# Patient Record
Sex: MALE | Race: WHITE | Employment: UNEMPLOYED | URBAN - NONMETROPOLITAN AREA
[De-identification: names, ages, dates, MRNs, and addresses within clinical notes are randomized per-mention and may not be internally consistent; named-entity substitution may affect disease eponyms.]

---

## 2017-07-18 ENCOUNTER — HOSPITAL ENCOUNTER (OUTPATIENT)
Dept: MRI IMAGING | Age: 52
Discharge: HOME | End: 2017-07-18
Payer: MEDICARE

## 2017-07-18 DIAGNOSIS — M48.061 SPINAL STENOSIS OF LUMBAR REGION: ICD-10-CM

## 2017-07-18 PROCEDURE — 72148 MRI LUMBAR SPINE W/O DYE: CPT

## 2018-07-03 ENCOUNTER — HOSPITAL ENCOUNTER (OUTPATIENT)
Dept: NON INVASIVE DIAGNOSTICS | Age: 53
Discharge: HOME | End: 2018-07-03
Payer: MEDICARE

## 2018-07-03 VITALS — WEIGHT: 252 LBS | BODY MASS INDEX: 31.33 KG/M2 | HEIGHT: 75 IN

## 2018-07-03 PROCEDURE — 78452 HT MUSCLE IMAGE SPECT MULT: CPT

## 2018-07-03 PROCEDURE — 6360000002 HC RX W HCPCS

## 2018-07-03 PROCEDURE — 93017 CV STRESS TEST TRACING ONLY: CPT

## 2018-07-03 PROCEDURE — 3430000000 HC RX DIAGNOSTIC RADIOPHARMACEUTICAL: Performed by: INTERNAL MEDICINE

## 2018-07-03 PROCEDURE — A9500 TC99M SESTAMIBI: HCPCS | Performed by: INTERNAL MEDICINE

## 2018-07-03 RX ADMIN — Medication 9.2 MILLICURIE: at 13:42

## 2018-07-03 RX ADMIN — Medication 31 MILLICURIE: at 14:45

## 2018-07-09 ENCOUNTER — HOSPITAL ENCOUNTER (OUTPATIENT)
Dept: GENERAL RADIOLOGY | Age: 53
Discharge: HOME | End: 2018-07-09
Payer: MEDICARE

## 2018-07-09 ENCOUNTER — HOSPITAL ENCOUNTER (OUTPATIENT)
Age: 53
Discharge: HOME | End: 2018-07-09
Payer: MEDICARE

## 2018-07-09 DIAGNOSIS — Z01.818 PREOP EXAMINATION: ICD-10-CM

## 2018-07-09 PROCEDURE — 71046 X-RAY EXAM CHEST 2 VIEWS: CPT

## 2019-04-17 ENCOUNTER — HOSPITAL ENCOUNTER (OUTPATIENT)
Dept: PHYSICAL THERAPY | Age: 54
Setting detail: THERAPIES SERIES
Discharge: HOME | End: 2019-04-17
Payer: MEDICARE

## 2019-04-17 PROCEDURE — 97110 THERAPEUTIC EXERCISES: CPT

## 2019-04-17 PROCEDURE — 97162 PT EVAL MOD COMPLEX 30 MIN: CPT

## 2019-04-17 ASSESSMENT — PAIN SCALES - GENERAL: PAINLEVEL_OUTOF10: 5

## 2019-04-17 ASSESSMENT — PAIN DESCRIPTION - LOCATION: LOCATION: HEAD;NECK

## 2019-04-17 ASSESSMENT — PAIN DESCRIPTION - PAIN TYPE: TYPE: CHRONIC PAIN

## 2019-04-17 NOTE — FLOWSHEET NOTE
** PLEASE SIGN, DATE AND TIME CERTIFICATION BELOW AND RETURN TO [facility] OUTPATIENT REHABILITATION (FAX #: [phone]). ATTEST/CO-SIGN IF ACCESSING VIA [employer]. THANK YOU.**    I certify that I have examined the patient below and determined that Physical Medicine and Rehabilitation service is necessary and that I approve the established plan of care for up to 90 days or as specifically noted. Attestation, signature or co-signature of physician indicates approval of certification requirements.    ________________________ ____________ __________  Physician Signature   Date   Time       Alysa Camarena 60  OUTPATIENT PHYSICAL THERAPY  EVALUATION  [facility]     Time In: 3305  Time Out: 4131  Minutes: 65  Timed Code Treatment Minutes: 10 Minutes(Ther EX)     Neck Disability Index  Score: 22/50 = 44% impairment    Date: 2019  Patient Name: Conchita Anglin,  Gender:  male        CSN: [id number]   : 1965  (47 y.o.)  Referral Date : 19     Referring Practitioner: Aleksander Kimbrough DO      Diagnosis: M54.12 (ICD-10-CM) - Radiculopathy, cervical region, M50.320 (ICD-10-CM) - Other cervical disc degeneration, mid-cervical region, unspecified level, M43.16 (ICD-10-CM) - Spondylolisthesis, lumbar region  Treatment Diagnosis: Cervicalgia, spinal stiffness, muscular weakness, B hand numbness  Additional Pertinent Hx: Lumbar fusion surgery 2018. Neck fusion surgery . Blood pressure issues, diabetes. General:  PT Visit Information  Onset Date: 19  PT Insurance Information: [address] - unlimited visits, $40 copay per visit, Aquatic and modalities covered, massage covered. Plan of Care/Certification Expiration Date: 19         See Medical History Questionnaire for information related to allergies and medications. Subjective:  Chart Reviewed: Yes  Comments: Eddi Mejias PCP, Dr. Bia Donald in August for lumbar.       Subjective: Patient had neck fusion surgery in 2001, ever since then he has had B arm numbness. More recently symptoms are getting worse, most pain in posterior neck and bilateral shoulders R>L. Reports difficulty dropping things, mostly numb when trying to sleep, or sitting the wrong way. Patient has had EMG studies and Dr. Armida Song thinks its carpal tunnel, not the neck. He has been walking 6 miles at the [facility] daily, rides stationary bike, leg presses, abdominal machines, but having trouble on upper body strength machines. FInishes in hot tub and pool jogging, swimming, balancing on one foot. Patient reports headache in posterior head, occasional dizziness when first standing up, also reporting lower blood pressure. Pain:  Patient Currently in Pain: Yes  Pain Assessment: 0-10  Pain Level: 5  Pain Type: Chronic pain  Pain Location: Head, Neck     Social/Functional History:    Lives With: Family  Type of Home: House  Home Layout: Two level, Able to Live on Main level with bedroom/bathroom     Bathroom Shower/Tub: Tub/Shower unit       ADL Assistance: Independent  Homemaking Assistance: Independent  Homemaking Responsibilities: Yes  Ambulation Assistance: Independent  Transfer Assistance: Independent    Active : Yes  Occupation: On disability  Type of occupation: On disability since 2001 surgery  Leisure & Hobbies: Babysitting granddaughter, gardening, yardwork    Objective        Observation/Palpation  Palpation: Painful spinous processes C5-T1, R sided facet pain and upper trap pain  Observation:  Forward head posture, elevated B scapulae, rounded shoulders    Cervical: Cerv rotation right 0-55 deg, left 0-60 deg painful, sidebend R 0-25 deg, sidebend L 0-20 deg, flexion 0-40 deg, extension 0-45 deg    Strength LUE: Exception  Comment:  130#, 120#, shoulder flex 4+/5, elbow flex/ext 5/5     Strength RUE: Exception  Comment:  120# x2 trials, shoulder flex 4+/5, elbow flex/ext 5/5            Overall Sensation Status: Impaired  Additional Comments: No diminished sensation today except L shoulder acromion region. At times he gets numbness entire hand up to elbow bilateral hands. Exercises  Exercise 1: Manual therapy - supine cervical traction 5 x60 sec   Exercise 2: Supine cervical retraction 10x 5 sec, scapular retraction, scapular retro rolls x10 each - HEP handout provided           NECK DISABILITY INVENTORY   Pain Intensity 2: The pain is moderate at the moment   Personal Care  * Washing, Dressing, etc 0: I can look after myself normally without causing extra pain   Lifting 3: Pain prevents me from lifting heavy weights but I can manage light to medium weights if they are conveniently placed   Reading 3: I can't read as much as I want because of moderate pain in my neck   Headaches 2: I have moderate headaches, which come infrequently   Concentration 2: I have a fair degree of difficulty in concentrating when I want to   Work 1: I can only do my usual work, but no more   Driving 3: I can't drive my car as long as I want because of moderate pain in my neck   Sleeping 4: My sleep is greatly disturbed (3-5 hrs sleepless)   Recreation 2: I am able to engage in most, but not all of my usual recreation activities because of pain in my neck    Total score/Total possible X 100 =   22/50 = 44% Disability           40-59% Impaired = CK           Activity Tolerance:  Activity Tolerance: Patient Tolerated treatment well  Activity Tolerance: Mild increased headache at end of session. Assessment: Body structures, Functions, Activity limitations: Decreased ROM, Decreased strength, Decreased sensation, Decreased high-level IADLs, Increased Pain  Assessment: Patient presents with chronic history of neck pain and B hand numbness, increased when trying to sleep and with long durations of walking.  He would benefit from physical therapy to decrease pain and improve radiating shoulder pain and hand numbness, and to increase flexibility and strength in cervical spine so he can continue to exercise regularly at [facility] for weight loss. Prognosis: Good       Patient Education:  Patient Education: HEP handout provided, attendance policy reviewed. Financial assistance offered, patient declined HCAP form. Plan:  Times per week: 2  Plan weeks: 8  Specific instructions for Next Treatment: Ultrasound combo estim to B cervical spine/upper traps to cause muscular fatigue (100%, 1.0 MHz, 1.0 w/cm2), supine manual traction, cervical and scapular retraction, postural exercises. Progress to overhead reaching strength for shoulders. Current Treatment Recommendations: Strengthening, ROM, Balance Training, Manual Therapy - Soft Tissue Mobilization, Manual Therapy - Joint Manipulation, Modalities, Home Exercise Program, Aquatics    History: Personal factors or comorbidities that impact plan of care -  Moderate Complexity: 1-2 personal factors or comorbidities. See history section above for details. Examination: Body structures and functions, activity limitations, participation restrictions; using standardized tests and measures - Moderate Complexity: 3 or morebody structures and functional, activity limitations and/or participation restrictions. See restrictions and objective section above for details. Clinical Presentation: Moderate - Evolving with Changing Characteristics: chronic neck pain with BUE radicular symptoms    Decision Making: Moderate Complexity due to limited relief of traction, no change in UE symptoms. Decision making was based on patient assessment and decision making process in determining plan of care and establishing reasonable expectations for measurable functional outcomes. Evaluation Complexity: Based on the findings of patient history, examination, clinical presentation, and decision making during this evaluation, the evaluation of Brooklynn Underwood  is of medium complexity. Goals:  Patient goals :  Yardwork, gardening, sleep less painful    Short term goals  Time Frame for Short term goals: 4 weeks  Short term goal 1: Patient will report decreased neck pain and headache pain from baseline 5/10 to less than 2/10 in order to tolerate longer durations of walking for exercise  Short term goal 2: Patient will demonstrate good cervical retraction and scapular retraction posture during therapy exercises without cues from therapist.  Short term goal 3: Patient will report ability to sleep comfortably with 50% less hand numbness waking him at night. Short term goal 4: Patient will be IND with HEP in order to meet long term goals. Long term goals  Time Frame for Long term goals : 8 weeks  Long term goal 1: Patient will improve score of Neck Disability Index from baseline 44% impairment to less than 20% in order to sleep more comfortably. Long term goal 2: Patient will increase B shoulder and scapular strength to 5/5 in order to stabilize cervical spine with reaching overhead. Long term goal 3: Patient will lift 20# box from floor to waist height with good body mechanics in order to protect cervical and lumbar spine during yardwork.     Abdulaziz Wallace, PT

## 2019-04-24 ENCOUNTER — HOSPITAL ENCOUNTER (OUTPATIENT)
Dept: PHYSICAL THERAPY | Age: 54
Setting detail: THERAPIES SERIES
Discharge: HOME | End: 2019-04-24
Payer: MEDICARE

## 2019-04-25 ENCOUNTER — APPOINTMENT (OUTPATIENT)
Dept: PHYSICAL THERAPY | Age: 54
End: 2019-04-25
Payer: MEDICARE

## 2019-04-29 ENCOUNTER — HOSPITAL ENCOUNTER (OUTPATIENT)
Dept: PHYSICAL THERAPY | Age: 54
Setting detail: THERAPIES SERIES
Discharge: HOME | End: 2019-04-29
Payer: MEDICARE

## 2019-04-29 PROCEDURE — 97140 MANUAL THERAPY 1/> REGIONS: CPT

## 2019-04-29 PROCEDURE — 97035 APP MDLTY 1+ULTRASOUND EA 15: CPT

## 2019-04-29 PROCEDURE — 97110 THERAPEUTIC EXERCISES: CPT

## 2019-04-29 ASSESSMENT — PAIN DESCRIPTION - LOCATION: LOCATION: HEAD;NECK

## 2019-04-29 ASSESSMENT — PAIN SCALES - GENERAL: PAINLEVEL_OUTOF10: 6

## 2019-04-29 ASSESSMENT — PAIN DESCRIPTION - PAIN TYPE: TYPE: CHRONIC PAIN

## 2019-04-29 NOTE — PROGRESS NOTES
Alysa Camarena 60  OUTPATIENT PHYSICAL THERAPY  DAILY NOTE  Marjorie White     Time In: 1300  Time Out: 1398  Minutes: 40  Timed Code Treatment Minutes: 40 Minutes                Date: 2019  Patient Name: Charbel Ruiz,  Gender:  male        CSN: [id number]   : 1965  (47 y.o.)  Referral Date : 19    Referring Practitioner: Kentrell Salter DO      Diagnosis: M54.12 (ICD-10-CM) - Radiculopathy, cervical region, M50.320 (ICD-10-CM) - Other cervical disc degeneration, mid-cervical region, unspecified level, M43.16 (ICD-10-CM) - Spondylolisthesis, lumbar region  Treatment Diagnosis: Cervicalgia, spinal stiffness, muscular weakness, B hand numbness   Additional Pertinent Hx: Lumbar fusion surgery 2018. Neck fusion surgery . Blood pressure issues, diabetes. General:  PT Visit Information  Onset Date: 19  PT Insurance Information: [address] - unlimited visits, $40 copay per visit, Aquatic and modalities covered, massage covered. Total # of Visits to Date: 1  Plan of Care/Certification Expiration Date: 19               Subjective:  Chart Reviewed: Yes  Comments: Arsen Schmidt PCP, Dr. Lawson Sim in August for lumbar. Subjective: Pt stated he just flew back from a friends  over the weekend and missed last weeks visits. Pt stated that he continues to have B UE numbness to fingertips. Pain:  Patient Currently in Pain: Yes  Pain Level: 6  Pain Type: Chronic pain  Pain Location: Head, Neck      Objective                                Exercises  Exercise 3: US ES combo intensity 17 red lead #2. to R and L upper traps to suboccipital mm. for 15 min. Exercise 4: supine cervical retraction with towel roll behind neck X10  Exercise 5: manual SOR for 5 min. cervical retraction held 60 sec. X3, manual upper trap, scalenes stretch held 30 sec.  X3  Exercise 6: standing shoulder extension, retraction, horizontal abd X15 with orange tband

## 2019-05-03 ENCOUNTER — HOSPITAL ENCOUNTER (OUTPATIENT)
Dept: PHYSICAL THERAPY | Age: 54
Setting detail: THERAPIES SERIES
Discharge: HOME | End: 2019-05-03
Payer: MEDICARE

## 2019-05-03 PROCEDURE — 97140 MANUAL THERAPY 1/> REGIONS: CPT

## 2019-05-03 PROCEDURE — 97035 APP MDLTY 1+ULTRASOUND EA 15: CPT

## 2019-05-03 PROCEDURE — 97110 THERAPEUTIC EXERCISES: CPT

## 2019-05-03 ASSESSMENT — PAIN SCALES - GENERAL: PAINLEVEL_OUTOF10: 6

## 2019-05-03 ASSESSMENT — PAIN DESCRIPTION - LOCATION: LOCATION: HEAD;NECK

## 2019-05-03 ASSESSMENT — PAIN DESCRIPTION - PAIN TYPE: TYPE: CHRONIC PAIN

## 2019-05-03 NOTE — PROGRESS NOTES
activation of abdominals to help support trunk with shoulders back. Activity Tolerance:  Activity Tolerance: Patient Tolerated treatment well    Assessment: Body structures, Functions, Activity limitations: Decreased ROM, Decreased strength, Decreased sensation, Decreased high-level IADLs, Increased Pain  Assessment: Continues to have good relief from manual therapy and combo ultrasound. Very tight paraspinals and upper trap, elevated scapular posture. Prognosis: Good       Patient Education:  Patient Education: Use of ice/heat, monitor posture with weekend renovations on his camper. Plan:  Times per week: 2  Plan weeks: 8  Specific instructions for Next Treatment: Ultrasound combo estim to B cervical spine/upper traps to cause muscular fatigue (100%, 1.0 MHz, 1.0 w/cm2), supine manual traction, cervical and scapular retraction, postural exercises. Progress to overhead reaching strength for shoulders. Current Treatment Recommendations: Strengthening, ROM, Balance Training, Manual Therapy - Soft Tissue Mobilization, Manual Therapy - Joint Manipulation, Modalities, Home Exercise Program, Aquatics  Plan Comment: cont per PT POC    Goals:  Patient goals : Yardwork, gardening, sleep less painful    Short term goals  Time Frame for Short term goals: 4 weeks  Short term goal 1: Patient will report decreased neck pain and headache pain from baseline 5/10 to less than 2/10 in order to tolerate longer durations of walking for exercise  Short term goal 2: Patient will demonstrate good cervical retraction and scapular retraction posture during therapy exercises without cues from therapist.  Short term goal 3: Patient will report ability to sleep comfortably with 50% less hand numbness waking him at night. Short term goal 4: Patient will be IND with HEP in order to meet long term goals.      Long term goals  Time Frame for Long term goals : 8 weeks  Long term goal 1: Patient will improve score of Neck Disability Index from baseline 44% impairment to less than 20% in order to sleep more comfortably. Long term goal 2: Patient will increase B shoulder and scapular strength to 5/5 in order to stabilize cervical spine with reaching overhead. Long term goal 3: Patient will lift 20# box from floor to waist height with good body mechanics in order to protect cervical and lumbar spine during yardwork.     Shahid Duran, PT

## 2019-05-06 ENCOUNTER — HOSPITAL ENCOUNTER (OUTPATIENT)
Dept: PHYSICAL THERAPY | Age: 54
Setting detail: THERAPIES SERIES
Discharge: HOME | End: 2019-05-06
Payer: MEDICARE

## 2019-05-06 PROCEDURE — 97035 APP MDLTY 1+ULTRASOUND EA 15: CPT

## 2019-05-06 PROCEDURE — 97140 MANUAL THERAPY 1/> REGIONS: CPT

## 2019-05-06 PROCEDURE — 97110 THERAPEUTIC EXERCISES: CPT

## 2019-05-06 ASSESSMENT — PAIN DESCRIPTION - LOCATION: LOCATION: HEAD;NECK

## 2019-05-06 ASSESSMENT — PAIN SCALES - GENERAL: PAINLEVEL_OUTOF10: 4

## 2019-05-06 ASSESSMENT — PAIN DESCRIPTION - PAIN TYPE: TYPE: CHRONIC PAIN

## 2019-05-06 NOTE — PROGRESS NOTES
Alysa Camarena 60  OUTPATIENT PHYSICAL THERAPY  DAILY NOTE  Devere Landau     Time In: 1300  Time Out: 8967  Minutes: 45  Timed Code Treatment Minutes: 45 Minutes                Date: 2019  Patient Name: Mahogany Hernandez,  Gender:  male        CSN: [id number]   : 1965  (47 y.o.)  Referral Date : 19    Referring Practitioner: Katina Devlin DO      Diagnosis: M54.12 (ICD-10-CM) - Radiculopathy, cervical region, M50.320 (ICD-10-CM) - Other cervical disc degeneration, mid-cervical region, unspecified level, M43.16 (ICD-10-CM) - Spondylolisthesis, lumbar region  Treatment Diagnosis: Cervicalgia, spinal stiffness, muscular weakness, B hand numbness   Additional Pertinent Hx: Lumbar fusion surgery 2018. Neck fusion surgery . Blood pressure issues, diabetes. General:  PT Visit Information  Onset Date: 19  PT Insurance Information: [address] - unlimited visits, $40 copay per visit, Aquatic and modalities covered, massage covered. Total # of Visits to Date: 4  Plan of Care/Certification Expiration Date: 19               Subjective:  Chart Reviewed: Yes  Comments: Nimesh Jacinto PCP, Dr. Zenobia Ridley in August for lumbar. Subjective: Pt stated he feels slightly better with a little less R neck pain. Pain:  Patient Currently in Pain: Yes  Pain Level: 4  Pain Type: Chronic pain  Pain Location: Head, Neck      Objective               Exercises  Exercise 3: US ES combo intensity 7 red lead #2. to R  upper traps to suboccipital mm. for 10 min. Exercise 4: supine cervical retraction with towel roll behind neck X10  Exercise 5: manual SOR for 5 min. cervical retraction held 60 sec. X3, manual upper trap, scalenes stretch held 30 sec. X3  Exercise 6: standing shoulder extension, retraction, horizontal abd X20 with lime tband cues for posture.   Exercise 8: prone B Rynes (shoulder flexion, abduction, extension on mat table with 1# weight X10  Exercise 9: cervical rotation x10 in pain freen range. , cervical isometrics held 10 sec. X3         Activity Tolerance:  Activity Tolerance: Patient Tolerated treatment well    Assessment: Body structures, Functions, Activity limitations: Decreased ROM, Decreased strength, Decreased sensation, Decreased high-level IADLs, Increased Pain  Assessment: Pt stated he feels cervical stiffness decreased after session but pain still is there. Pt educated to use towel roll for added support of cervical spine. Added prone scapular stretchening this session with good tolercnce  Prognosis: Good       Patient Education:  Patient Education: posture, HEP, towel roll. Plan:  Times per week: 2  Plan weeks: 8  Specific instructions for Next Treatment: Ultrasound combo estim to B cervical spine/upper traps to cause muscular fatigue (100%, 1.0 MHz, 1.0 w/cm2), supine manual traction, cervical and scapular retraction, postural exercises. Progress to overhead reaching strength for shoulders. Current Treatment Recommendations: Strengthening, ROM, Balance Training, Manual Therapy - Soft Tissue Mobilization, Manual Therapy - Joint Manipulation, Modalities, Home Exercise Program, Aquatics  Plan Comment: cont per PT POC    Goals:  Patient goals : Yardwork, gardening, sleep less painful    Short term goals  Time Frame for Short term goals: 4 weeks  Short term goal 1: Patient will report decreased neck pain and headache pain from baseline 5/10 to less than 2/10 in order to tolerate longer durations of walking for exercise  Short term goal 2: Patient will demonstrate good cervical retraction and scapular retraction posture during therapy exercises without cues from therapist.  Short term goal 3: Patient will report ability to sleep comfortably with 50% less hand numbness waking him at night. Short term goal 4: Patient will be IND with HEP in order to meet long term goals.      Long term goals  Time Frame for Long term goals : 8 weeks  Long term goal 1: Patient will improve score of Neck Disability Index from baseline 44% impairment to less than 20% in order to sleep more comfortably. Long term goal 2: Patient will increase B shoulder and scapular strength to 5/5 in order to stabilize cervical spine with reaching overhead. Long term goal 3: Patient will lift 20# box from floor to waist height with good body mechanics in order to protect cervical and lumbar spine during yardwork.     Davina Castorena, PTA

## 2019-05-10 ENCOUNTER — HOSPITAL ENCOUNTER (OUTPATIENT)
Dept: PHYSICAL THERAPY | Age: 54
Setting detail: THERAPIES SERIES
Discharge: HOME | End: 2019-05-10
Payer: MEDICARE

## 2019-05-10 PROCEDURE — 97035 APP MDLTY 1+ULTRASOUND EA 15: CPT

## 2019-05-10 PROCEDURE — 97110 THERAPEUTIC EXERCISES: CPT

## 2019-05-10 PROCEDURE — 97140 MANUAL THERAPY 1/> REGIONS: CPT

## 2019-05-10 ASSESSMENT — PAIN SCALES - GENERAL: PAINLEVEL_OUTOF10: 8

## 2019-05-10 ASSESSMENT — PAIN DESCRIPTION - PAIN TYPE: TYPE: CHRONIC PAIN

## 2019-05-10 ASSESSMENT — PAIN DESCRIPTION - LOCATION: LOCATION: NECK;HEAD

## 2019-05-10 NOTE — PROGRESS NOTES
Alysa Camarena 60  OUTPATIENT PHYSICAL THERAPY  DAILY NOTE  Joseia Aureliano     Time In: 1300  Time Out: 1175  Minutes: 45  Timed Code Treatment Minutes: 45 Minutes                Date: 5/10/2019  Patient Name: Andrés Hopkins,  Gender:  male        CSN: [id number]   : 1965  (47 y.o.)  Referral Date : 19    Referring Practitioner: Jose Miguel Cotto DO      Diagnosis: M54.12 (ICD-10-CM) - Radiculopathy, cervical region, M50.320 (ICD-10-CM) - Other cervical disc degeneration, mid-cervical region, unspecified level, M43.16 (ICD-10-CM) - Spondylolisthesis, lumbar region  Treatment Diagnosis: Cervicalgia, spinal stiffness, muscular weakness, B hand numbness   Additional Pertinent Hx: Lumbar fusion surgery 2018. Neck fusion surgery . Blood pressure issues, diabetes. General:  PT Visit Information  Onset Date: 19  PT Insurance Information: [address] - unlimited visits, $40 copay per visit, Aquatic and modalities covered, massage covered. Total # of Visits to Date: 5  Plan of Care/Certification Expiration Date: 19               Subjective:  Chart Reviewed: Yes  Comments: Gordo Guthrie PCP, Dr. Mejia Aid in August for lumbar. Subjective: Patient's pain is severely elevated today, he states that he did nothing different the past few days, has not been performing upper body exercise in fitness center at [facility]. Pain 8/10 and now radiates into midscapular/rhomboid regions. Denies radiating pain into either UE, just top of shoulder     Pain:  Patient Currently in Pain: Yes  Pain Assessment: 0-10  Pain Level: 8  Pain Type: Chronic pain  Pain Location: Neck, Head      Objective    Exercises  Exercise 3: US ES combo intensity 9 red lead #2. to R  upper traps, middle traps to suboccipital mm. for 15 min. Exercise 9: cervical rotation x10 in pain freen range. , cervical isometrics held 10 sec.  X3  Exercise 10: Seated soft tissue mobilization and myofascial release x15 min to B upper traps, middle traps. Multiple tender points especially in RIght trapezius muscle and rhomboids, levator scap on R side. Hawk  to this region x2 mins each side \"feels good\"   Exercise 11: Finished with posterior capsule stretch 2x 30 sec each         Activity Tolerance:  Activity Tolerance: Patient Tolerated treatment well    Assessment: Body structures, Functions, Activity limitations: Decreased ROM, Decreased strength, Decreased sensation, Decreased high-level IADLs, Increased Pain  Assessment: Held most of session and extended time for ultrasound combo, progressed manual therapy techniques with patient reporting decreased pain afterward. Prognosis: Good       Patient Education:  Patient Education: posture, HEP, towel roll. Plan:  Times per week: 2  Plan weeks: 8  Specific instructions for Next Treatment: Ultrasound combo estim to B cervical spine/upper traps to cause muscular fatigue (100%, 1.0 MHz, 1.0 w/cm2), supine manual traction, cervical and scapular retraction, postural exercises. Progress to overhead reaching strength for shoulders. Current Treatment Recommendations: Strengthening, ROM, Balance Training, Manual Therapy - Soft Tissue Mobilization, Manual Therapy - Joint Manipulation, Modalities, Home Exercise Program, Aquatics  Plan Comment: cont per PT POC    Goals:  Patient goals : Yardwork, gardening, sleep less painful    Short term goals  Time Frame for Short term goals: 4 weeks  Short term goal 1: Patient will report decreased neck pain and headache pain from baseline 5/10 to less than 2/10 in order to tolerate longer durations of walking for exercise  Short term goal 2: Patient will demonstrate good cervical retraction and scapular retraction posture during therapy exercises without cues from therapist.  Short term goal 3: Patient will report ability to sleep comfortably with 50% less hand numbness waking him at night.   Short term goal 4: Patient will be

## 2019-05-13 ENCOUNTER — APPOINTMENT (OUTPATIENT)
Dept: PHYSICAL THERAPY | Age: 54
End: 2019-05-13
Payer: MEDICARE

## 2019-05-17 ENCOUNTER — HOSPITAL ENCOUNTER (OUTPATIENT)
Dept: PHYSICAL THERAPY | Age: 54
Setting detail: THERAPIES SERIES
Discharge: HOME | End: 2019-05-17
Payer: MEDICARE

## 2019-11-05 ENCOUNTER — HOSPITAL ENCOUNTER (OUTPATIENT)
Dept: ULTRASOUND IMAGING | Age: 54
Discharge: HOME | End: 2019-11-05
Payer: MEDICARE

## 2019-11-05 ENCOUNTER — HOSPITAL ENCOUNTER (OUTPATIENT)
Age: 54
Discharge: HOME | End: 2019-11-05
Payer: MEDICARE

## 2019-11-05 DIAGNOSIS — R10.11 ABDOMINAL PAIN, RIGHT UPPER QUADRANT: ICD-10-CM

## 2019-11-05 LAB
ALBUMIN SERPL-MCNC: 4.7 G/DL (ref 3.5–5.1)
ALP BLD-CCNC: 70 U/L (ref 38–126)
ALT SERPL-CCNC: 20 U/L (ref 11–66)
AMYLASE: 102 U/L (ref 20–104)
ANION GAP SERPL CALCULATED.3IONS-SCNC: 18 MEQ/L (ref 8–16)
AST SERPL-CCNC: 18 U/L (ref 5–40)
BASOPHILS # BLD: 0.8 %
BASOPHILS ABSOLUTE: 0.1 THOU/MM3 (ref 0–0.1)
BILIRUB SERPL-MCNC: 0.5 MG/DL (ref 0.3–1.2)
BUN BLDV-MCNC: 16 MG/DL (ref 7–22)
CALCIUM SERPL-MCNC: 10.3 MG/DL (ref 8.5–10.5)
CHLORIDE BLD-SCNC: 95 MEQ/L (ref 98–111)
CO2: 25 MEQ/L (ref 23–33)
CREAT SERPL-MCNC: 1.3 MG/DL (ref 0.4–1.2)
EOSINOPHIL # BLD: 0.9 %
EOSINOPHILS ABSOLUTE: 0.1 THOU/MM3 (ref 0–0.4)
ERYTHROCYTE [DISTWIDTH] IN BLOOD BY AUTOMATED COUNT: 13.4 % (ref 11.5–14.5)
ERYTHROCYTE [DISTWIDTH] IN BLOOD BY AUTOMATED COUNT: 43.5 FL (ref 35–45)
GFR SERPL CREATININE-BSD FRML MDRD: 57 ML/MIN/1.73M2
GLUCOSE BLD-MCNC: 134 MG/DL (ref 70–108)
HCT VFR BLD CALC: 48.6 % (ref 42–52)
HEMOGLOBIN: 16.3 GM/DL (ref 14–18)
IMMATURE GRANS (ABS): 0.01 THOU/MM3 (ref 0–0.07)
IMMATURE GRANULOCYTES: 0.2 %
LIPASE: 52.9 U/L (ref 5.6–51.3)
LYMPHOCYTES # BLD: 30.7 %
LYMPHOCYTES ABSOLUTE: 2.1 THOU/MM3 (ref 1–4.8)
MCH RBC QN AUTO: 29.7 PG (ref 26–33)
MCHC RBC AUTO-ENTMCNC: 33.5 GM/DL (ref 32.2–35.5)
MCV RBC AUTO: 88.5 FL (ref 80–94)
MONOCYTES # BLD: 6.6 %
MONOCYTES ABSOLUTE: 0.4 THOU/MM3 (ref 0.4–1.3)
NUCLEATED RED BLOOD CELLS: 0 /100 WBC
PLATELET # BLD: 252 THOU/MM3 (ref 130–400)
PMV BLD AUTO: 10.2 FL (ref 9.4–12.4)
POTASSIUM SERPL-SCNC: 4.1 MEQ/L (ref 3.5–5.2)
RBC # BLD: 5.49 MILL/MM3 (ref 4.7–6.1)
SEG NEUTROPHILS: 60.8 %
SEGMENTED NEUTROPHILS ABSOLUTE COUNT: 4.1 THOU/MM3 (ref 1.8–7.7)
SODIUM BLD-SCNC: 138 MEQ/L (ref 135–145)
TOTAL PROTEIN: 8.2 G/DL (ref 6.1–8)
WBC # BLD: 6.7 THOU/MM3 (ref 4.8–10.8)

## 2019-11-05 PROCEDURE — 85025 COMPLETE CBC W/AUTO DIFF WBC: CPT

## 2019-11-05 PROCEDURE — 80053 COMPREHEN METABOLIC PANEL: CPT

## 2019-11-05 PROCEDURE — 36415 COLL VENOUS BLD VENIPUNCTURE: CPT

## 2019-11-05 PROCEDURE — 76705 ECHO EXAM OF ABDOMEN: CPT

## 2019-11-05 PROCEDURE — 82150 ASSAY OF AMYLASE: CPT

## 2019-11-05 PROCEDURE — 83690 ASSAY OF LIPASE: CPT

## 2022-12-02 ENCOUNTER — HOSPITAL ENCOUNTER (OUTPATIENT)
Dept: CT IMAGING | Age: 57
Discharge: HOME | End: 2022-12-02
Payer: COMMERCIAL

## 2022-12-02 DIAGNOSIS — K43.9 VENTRAL HERNIA WITHOUT OBSTRUCTION OR GANGRENE: ICD-10-CM

## 2022-12-02 DIAGNOSIS — R19.03 ABDOMINAL SWELLING, RIGHT LOWER QUADRANT: ICD-10-CM

## 2022-12-02 PROCEDURE — 74176 CT ABD & PELVIS W/O CONTRAST: CPT

## 2023-08-02 ENCOUNTER — HOSPITAL ENCOUNTER (OUTPATIENT)
Dept: GENERAL RADIOLOGY | Age: 58
Discharge: HOME | End: 2023-08-02
Payer: COMMERCIAL

## 2023-08-02 DIAGNOSIS — R13.10 PROBLEMS WITH SWALLOWING AND MASTICATION: ICD-10-CM

## 2023-08-02 PROCEDURE — 74240 X-RAY XM UPR GI TRC 1CNTRST: CPT

## 2023-08-02 PROCEDURE — 2500000003 HC RX 250 WO HCPCS

## 2023-08-02 PROCEDURE — 6370000000 HC RX 637 (ALT 250 FOR IP)

## 2023-08-02 RX ADMIN — ANTACID/ANTIFLATULENT 1 EACH: 380; 550; 10; 10 GRANULE, EFFERVESCENT ORAL at 10:17

## 2023-08-02 RX ADMIN — BARIUM SULFATE 50 ML: 0.6 SUSPENSION ORAL at 10:16

## 2023-08-02 RX ADMIN — BARIUM SULFATE 140 ML: 980 POWDER, FOR SUSPENSION ORAL at 10:17

## 2023-08-07 ENCOUNTER — HOSPITAL ENCOUNTER (OUTPATIENT)
Dept: MRI IMAGING | Age: 58
Discharge: HOME | End: 2023-08-07
Payer: COMMERCIAL

## 2023-08-07 DIAGNOSIS — L97.511 RIGHT FOOT ULCER, LIMITED TO BREAKDOWN OF SKIN (HCC): ICD-10-CM

## 2023-08-07 PROCEDURE — 73720 MRI LWR EXTREMITY W/O&W/DYE: CPT

## 2023-08-07 PROCEDURE — A9579 GAD-BASE MR CONTRAST NOS,1ML: HCPCS

## 2023-08-07 PROCEDURE — 6360000004 HC RX CONTRAST MEDICATION

## 2023-08-07 RX ADMIN — GADOTERIDOL 20 ML: 279.3 INJECTION, SOLUTION INTRAVENOUS at 10:03

## 2023-10-16 ENCOUNTER — APPOINTMENT (OUTPATIENT)
Dept: NON INVASIVE DIAGNOSTICS | Age: 58
End: 2023-10-16
Payer: COMMERCIAL

## 2023-10-16 ENCOUNTER — HOSPITAL ENCOUNTER (OUTPATIENT)
Dept: NON INVASIVE DIAGNOSTICS | Age: 58
Discharge: HOME | End: 2023-10-16
Payer: COMMERCIAL

## 2023-10-16 DIAGNOSIS — R07.9 CHEST PAIN, UNSPECIFIED TYPE: ICD-10-CM

## 2023-10-16 PROCEDURE — 6360000002 HC RX W HCPCS

## 2023-10-16 PROCEDURE — 78452 HT MUSCLE IMAGE SPECT MULT: CPT | Performed by: INTERNAL MEDICINE

## 2023-10-16 PROCEDURE — 3430000000 HC RX DIAGNOSTIC RADIOPHARMACEUTICAL

## 2023-10-16 PROCEDURE — 93017 CV STRESS TEST TRACING ONLY: CPT | Performed by: INTERNAL MEDICINE

## 2023-10-16 PROCEDURE — A9500 TC99M SESTAMIBI: HCPCS

## 2023-10-16 RX ORDER — TETRAKIS(2-METHOXYISOBUTYLISOCYANIDE)COPPER(I) TETRAFLUOROBORATE 1 MG/ML
10.3 INJECTION, POWDER, LYOPHILIZED, FOR SOLUTION INTRAVENOUS
Status: COMPLETED | OUTPATIENT
Start: 2023-10-16 | End: 2023-10-16

## 2023-10-16 RX ORDER — TETRAKIS(2-METHOXYISOBUTYLISOCYANIDE)COPPER(I) TETRAFLUOROBORATE 1 MG/ML
33.7 INJECTION, POWDER, LYOPHILIZED, FOR SOLUTION INTRAVENOUS
Status: COMPLETED | OUTPATIENT
Start: 2023-10-16 | End: 2023-10-16

## 2023-10-16 RX ADMIN — Medication 33.7 MILLICURIE: at 14:52

## 2023-10-16 RX ADMIN — Medication 10.3 MILLICURIE: at 13:55

## 2023-10-17 ENCOUNTER — TELEPHONE (OUTPATIENT)
Dept: CARDIOLOGY CLINIC | Age: 58
End: 2023-10-17

## 2023-10-17 NOTE — TELEPHONE ENCOUNTER
I called Melinda Hernandez back and appt scheduled on 10/19 at 1045. She states she will notify patient.

## 2023-10-17 NOTE — TELEPHONE ENCOUNTER
Patient's pcp office is calling to refer him to cardiology d/t an abnormal stress. Test was read by Hunter Cano, but there is not a preference, they thought since he read it to schedule with him, but are okay with first available. 11/07/2023 was the first one I found. They asked if pt can be seen sooner d/t the abnormal stress. Clinical staff unavailable at this time to help with scheduling.     Call back Yenifer Keene, at [facility], # [phone]

## 2023-10-19 ENCOUNTER — OFFICE VISIT (OUTPATIENT)
Dept: CARDIOLOGY CLINIC | Age: 58
End: 2023-10-19

## 2023-10-19 VITALS
DIASTOLIC BLOOD PRESSURE: 80 MMHG | HEART RATE: 77 BPM | HEIGHT: 75 IN | WEIGHT: 248 LBS | SYSTOLIC BLOOD PRESSURE: 130 MMHG | BODY MASS INDEX: 30.84 KG/M2

## 2023-10-19 DIAGNOSIS — Z82.49 FAMILY HISTORY OF PREMATURE CAD: ICD-10-CM

## 2023-10-19 DIAGNOSIS — R06.02 SOB (SHORTNESS OF BREATH) ON EXERTION: ICD-10-CM

## 2023-10-19 DIAGNOSIS — R07.9 CHEST PAIN ON EXERTION: Primary | ICD-10-CM

## 2023-10-19 DIAGNOSIS — I10 PRIMARY HYPERTENSION: ICD-10-CM

## 2023-10-19 DIAGNOSIS — N18.31 STAGE 3A CHRONIC KIDNEY DISEASE (HCC): ICD-10-CM

## 2023-10-19 DIAGNOSIS — R94.39 ABNORMAL NUCLEAR STRESS TEST: ICD-10-CM

## 2023-10-19 PROBLEM — N18.30 STAGE 3 CHRONIC KIDNEY DISEASE (HCC): Status: ACTIVE | Noted: 2023-10-19

## 2023-10-19 RX ORDER — HYDROCHLOROTHIAZIDE 25 MG/1
25 TABLET ORAL DAILY
COMMUNITY

## 2023-10-19 RX ORDER — CYCLOBENZAPRINE HCL 10 MG
10 TABLET ORAL 3 TIMES DAILY PRN
COMMUNITY

## 2023-10-19 RX ORDER — PROCHLORPERAZINE 25 MG/1
SUPPOSITORY RECTAL
COMMUNITY

## 2023-10-19 RX ORDER — NITROGLYCERIN 0.4 MG/1
0.4 TABLET SUBLINGUAL EVERY 5 MIN PRN
Qty: 25 TABLET | Refills: 1 | Status: SHIPPED | OUTPATIENT
Start: 2023-10-19

## 2023-10-19 RX ORDER — SILDENAFIL 100 MG/1
100 TABLET, FILM COATED ORAL PRN
COMMUNITY
End: 2023-10-19

## 2023-10-19 RX ORDER — ASPIRIN 81 MG/1
81 TABLET ORAL DAILY
Qty: 90 TABLET | Refills: 0 | Status: SHIPPED | OUTPATIENT
Start: 2023-10-19

## 2023-10-19 RX ORDER — LISINOPRIL 10 MG/1
10 TABLET ORAL DAILY
Qty: 90 TABLET | Refills: 0 | Status: SHIPPED | OUTPATIENT
Start: 2023-10-19

## 2023-10-19 RX ORDER — ISOSORBIDE MONONITRATE 30 MG/1
30 TABLET, EXTENDED RELEASE ORAL DAILY
COMMUNITY
End: 2023-10-19 | Stop reason: SDUPTHER

## 2023-10-19 RX ORDER — GABAPENTIN 300 MG/1
300 CAPSULE ORAL 3 TIMES DAILY
COMMUNITY

## 2023-10-19 RX ORDER — OMEPRAZOLE 40 MG/1
40 CAPSULE, DELAYED RELEASE ORAL DAILY
COMMUNITY

## 2023-10-19 RX ORDER — LISINOPRIL 10 MG/1
10 TABLET ORAL DAILY
COMMUNITY
End: 2023-10-19 | Stop reason: SDUPTHER

## 2023-10-19 RX ORDER — ALLOPURINOL 100 MG/1
100 TABLET ORAL DAILY
COMMUNITY

## 2023-10-19 RX ORDER — ASPIRIN 81 MG/1
81 TABLET ORAL DAILY
COMMUNITY
End: 2023-10-19 | Stop reason: SDUPTHER

## 2023-10-19 RX ORDER — PEN NEEDLE, DIABETIC 31 GX3/16"
1 NEEDLE, DISPOSABLE MISCELLANEOUS DAILY
COMMUNITY

## 2023-10-19 RX ORDER — ISOSORBIDE MONONITRATE 30 MG/1
30 TABLET, EXTENDED RELEASE ORAL DAILY
Qty: 90 TABLET | Refills: 0 | Status: SHIPPED | OUTPATIENT
Start: 2023-10-19

## 2023-10-19 RX ORDER — NITROGLYCERIN 0.4 MG/1
0.4 TABLET SUBLINGUAL EVERY 5 MIN PRN
COMMUNITY
End: 2023-10-19 | Stop reason: SDUPTHER

## 2023-10-19 RX ORDER — LISINOPRIL 20 MG/1
20 TABLET ORAL DAILY
COMMUNITY
End: 2023-10-19

## 2023-10-19 NOTE — PROGRESS NOTES
[facility] See Note 04/29/2022 07:38 AM    LDLDIRECT 133 04/29/2022 07:38 AM    LABVLDL 85 04/29/2022 07:38 AM     TSH:  No results found for: \"TSH\"     Conclusions      Summary   The T.I.D. ratio was 1.43 . The nuclear images is suggestive for Mild myocardial ischemia in the apex   and distal anterior wall. Recommendation   Clinical correlation is recommended. Signatures      ----------------------------------------------------------------   Electronically signed by Linden Schmidt MD (Interpreting   Cardiologist) on 10/16/2023 at 17:31    Ekg 10/19/23  Sinus  Rhythm   WITHIN NORMAL LIMITS      Assessment     Diagnosis Orders   1. Chest pain on exertion  EKG 12 Lead    XR Cardiac Cath Procedure    ECHO Complete 2D W Doppler W Color      2. SOB (shortness of breath) on exertion  EKG 12 Lead    XR Cardiac Cath Procedure    ECHO Complete 2D W Doppler W Color      3. Abnormal nuclear stress test  EKG 12 Lead    XR Cardiac Cath Procedure    ECHO Complete 2D W Doppler W Color      4. Primary hypertension  EKG 12 Lead    XR Cardiac Cath Procedure    ECHO Complete 2D W Doppler W Color      5. Stage 3a chronic kidney disease (HCC)  EKG 12 Lead    XR Cardiac Cath Procedure    ECHO Complete 2D W Doppler W Color      6. Family history of premature CAD  EKG 12 Lead    XR Cardiac Cath Procedure    ECHO Complete 2D W Doppler W Color          CKD III      Plan       Continue the current treatment and with constant vigilance to changes in symptoms and also any potential side effects. Return for care or seek medical attention immediately if symptoms got worse and/or develop new symptoms.     Chest pain on exertion  Sob on exertion  Abn nuc Stress  Risk factors  Echo  Start asa 81  Start lopressor 25 po bid  Decrease lisinopril 10 mg po qd from 20 mg po qd  Start imdur 30 mg po qd  Sl NGT as needed    Need cath in view cp on exertion and abn nuc stress  Prehydration  Advised to avoid heavy exercise and physical work and to

## 2023-10-20 ENCOUNTER — TELEPHONE (OUTPATIENT)
Dept: CARDIOLOGY CLINIC | Age: 58
End: 2023-10-20

## 2023-10-20 NOTE — TELEPHONE ENCOUNTER
VO per Dr. Kaitylnn Stewart for pt to stop Viagra as he started on Imdur and Nitro. Please agree to VO. LM for pt to return call.

## 2023-11-09 ENCOUNTER — PREP FOR PROCEDURE (OUTPATIENT)
Dept: CARDIOLOGY | Age: 58
End: 2023-11-09

## 2023-11-09 RX ORDER — SODIUM CHLORIDE 9 MG/ML
INJECTION, SOLUTION INTRAVENOUS PRN
Status: CANCELLED | OUTPATIENT
Start: 2023-11-09

## 2023-11-09 RX ORDER — SODIUM CHLORIDE 0.9 % (FLUSH) 0.9 %
5-40 SYRINGE (ML) INJECTION EVERY 12 HOURS SCHEDULED
Status: CANCELLED | OUTPATIENT
Start: 2023-11-09

## 2023-11-09 RX ORDER — NITROGLYCERIN 0.4 MG/1
0.4 TABLET SUBLINGUAL EVERY 5 MIN PRN
Status: CANCELLED | OUTPATIENT
Start: 2023-11-09

## 2023-11-09 RX ORDER — SODIUM CHLORIDE 0.9 % (FLUSH) 0.9 %
5-40 SYRINGE (ML) INJECTION PRN
Status: CANCELLED | OUTPATIENT
Start: 2023-11-09

## 2023-11-09 RX ORDER — SODIUM CHLORIDE 9 MG/ML
INJECTION, SOLUTION INTRAVENOUS CONTINUOUS
Status: CANCELLED | OUTPATIENT
Start: 2023-11-09

## 2023-11-09 RX ORDER — ASPIRIN 325 MG
325 TABLET ORAL ONCE
Status: CANCELLED | OUTPATIENT
Start: 2023-11-09 | End: 2023-11-09

## 2023-11-10 ENCOUNTER — HOSPITAL ENCOUNTER (OUTPATIENT)
Age: 58
Discharge: HOME | End: 2023-11-11
Attending: INTERNAL MEDICINE | Admitting: INTERNAL MEDICINE
Payer: COMMERCIAL

## 2023-11-10 ENCOUNTER — APPOINTMENT (OUTPATIENT)
Dept: CARDIAC CATH/INVASIVE PROCEDURES | Age: 58
End: 2023-11-10
Payer: COMMERCIAL

## 2023-11-10 ENCOUNTER — HOSPITAL ENCOUNTER (OUTPATIENT)
Age: 58
End: 2023-11-10
Attending: INTERNAL MEDICINE
Payer: COMMERCIAL

## 2023-11-10 VITALS
WEIGHT: 248 LBS | HEIGHT: 75 IN | BODY MASS INDEX: 30.84 KG/M2 | DIASTOLIC BLOOD PRESSURE: 80 MMHG | SYSTOLIC BLOOD PRESSURE: 130 MMHG

## 2023-11-10 DIAGNOSIS — R07.9 CHEST PAIN ON EXERTION: ICD-10-CM

## 2023-11-10 DIAGNOSIS — N18.31 STAGE 3A CHRONIC KIDNEY DISEASE (HCC): ICD-10-CM

## 2023-11-10 DIAGNOSIS — R06.02 SOB (SHORTNESS OF BREATH) ON EXERTION: ICD-10-CM

## 2023-11-10 DIAGNOSIS — R94.39 ABNORMAL NUCLEAR STRESS TEST: ICD-10-CM

## 2023-11-10 DIAGNOSIS — Z82.49 FAMILY HISTORY OF PREMATURE CAD: ICD-10-CM

## 2023-11-10 DIAGNOSIS — I10 PRIMARY HYPERTENSION: ICD-10-CM

## 2023-11-10 DIAGNOSIS — R94.39 ABNORMAL STRESS TEST: ICD-10-CM

## 2023-11-10 PROBLEM — Z95.820 STATUS POST ANGIOPLASTY WITH STENT: Status: ACTIVE | Noted: 2023-11-10

## 2023-11-10 PROBLEM — Z98.890 S/P CARDIAC CATHETERIZATION: Status: ACTIVE | Noted: 2023-11-10

## 2023-11-10 LAB
ABO: NORMAL
ACTIVATED CLOTTING TIME: 249 SECONDS (ref 1–150)
ANION GAP SERPL CALC-SCNC: 8 MEQ/L (ref 8–16)
ANTIBODY SCREEN: NORMAL
BUN SERPL-MCNC: 15 MG/DL (ref 7–22)
CALCIUM SERPL-MCNC: 9.7 MG/DL (ref 8.5–10.5)
CHLORIDE SERPL-SCNC: 105 MEQ/L (ref 98–111)
CHOLEST SERPL-MCNC: 182 MG/DL (ref 100–199)
CO2 SERPL-SCNC: 27 MEQ/L (ref 23–33)
CREAT SERPL-MCNC: 1.3 MG/DL (ref 0.4–1.2)
DEPRECATED MEAN GLUCOSE BLD GHB EST-ACNC: 171 MG/DL (ref 70–126)
DEPRECATED RDW RBC AUTO: 43.9 FL (ref 35–45)
ECHO AO ASC DIAM: 3.1 CM
ECHO AO ASCENDING AORTA INDEX: 1.29 CM/M2
ECHO AV CUSP MM: 2 CM
ECHO AV PEAK GRADIENT: 9 MMHG
ECHO AV PEAK VELOCITY: 1.5 M/S
ECHO AV VELOCITY RATIO: 0.73
ECHO BSA: 2.44 M2
ECHO BSA: 2.44 M2
ECHO IVC PROX: 1.2 CM
ECHO LA AREA 2C: 15.7 CM2
ECHO LA AREA 4C: 19 CM2
ECHO LA DIAMETER INDEX: 1.63 CM/M2
ECHO LA DIAMETER: 3.9 CM
ECHO LA MAJOR AXIS: 5 CM
ECHO LA MINOR AXIS: 4.8 CM
ECHO LA VOL BP: 49 ML (ref 18–58)
ECHO LA VOL MOD A2C: 43 ML (ref 18–58)
ECHO LA VOL MOD A4C: 54 ML (ref 18–58)
ECHO LA VOL/BSA BIPLANE: 20 ML/M2 (ref 16–34)
ECHO LA VOLUME INDEX MOD A2C: 18 ML/M2 (ref 16–34)
ECHO LA VOLUME INDEX MOD A4C: 23 ML/M2 (ref 16–34)
ECHO LV E' LATERAL VELOCITY: 17 CM/S
ECHO LV E' SEPTAL VELOCITY: 12 CM/S
ECHO LV FRACTIONAL SHORTENING: 34 % (ref 28–44)
ECHO LV INTERNAL DIMENSION DIASTOLE INDEX: 2.21 CM/M2
ECHO LV INTERNAL DIMENSION DIASTOLIC: 5.3 CM (ref 4.2–5.9)
ECHO LV INTERNAL DIMENSION SYSTOLIC INDEX: 1.46 CM/M2
ECHO LV INTERNAL DIMENSION SYSTOLIC: 3.5 CM
ECHO LV ISOVOLUMETRIC RELAXATION TIME (IVRT): 63 MS
ECHO LV IVSD: 1.1 CM (ref 0.6–1)
ECHO LV MASS 2D: 227.7 G (ref 88–224)
ECHO LV MASS INDEX 2D: 94.9 G/M2 (ref 49–115)
ECHO LV POSTERIOR WALL DIASTOLIC: 1.1 CM (ref 0.6–1)
ECHO LV RELATIVE WALL THICKNESS RATIO: 0.42
ECHO LVOT PEAK GRADIENT: 4 MMHG
ECHO LVOT PEAK VELOCITY: 1.1 M/S
ECHO MV A VELOCITY: 0.72 M/S
ECHO MV E DECELERATION TIME (DT): 222 MS
ECHO MV E VELOCITY: 0.94 M/S
ECHO MV E/A RATIO: 1.31
ECHO MV E/E' LATERAL: 5.53
ECHO PV MAX VELOCITY: 0.9 M/S
ECHO PV PEAK GRADIENT: 3 MMHG
ECHO RV INTERNAL DIMENSION: 3 CM
ECHO RV TAPSE: 2.8 CM (ref 1.7–?)
ECHO TV E WAVE: 0.8 M/S
EKG ATRIAL RATE: 53 BPM
EKG ATRIAL RATE: 58 BPM
EKG P AXIS: 41 DEGREES
EKG P AXIS: 69 DEGREES
EKG P-R INTERVAL: 186 MS
EKG P-R INTERVAL: 198 MS
EKG Q-T INTERVAL: 418 MS
EKG Q-T INTERVAL: 436 MS
EKG QRS DURATION: 84 MS
EKG QRS DURATION: 92 MS
EKG QTC CALCULATION (BAZETT): 392 MS
EKG QTC CALCULATION (BAZETT): 428 MS
EKG R AXIS: 35 DEGREES
EKG R AXIS: 72 DEGREES
EKG T AXIS: 43 DEGREES
EKG T AXIS: 44 DEGREES
EKG VENTRICULAR RATE: 53 BPM
EKG VENTRICULAR RATE: 58 BPM
ERYTHROCYTE [DISTWIDTH] IN BLOOD BY AUTOMATED COUNT: 14.4 % (ref 11.5–14.5)
GFR SERPL CREATININE-BSD FRML MDRD: > 60 ML/MIN/1.73M2
GLUCOSE SERPL-MCNC: 132 MG/DL (ref 70–108)
HBA1C MFR BLD HPLC: 7.7 % (ref 4.4–6.4)
HCT VFR BLD AUTO: 41.1 % (ref 42–52)
HDLC SERPL-MCNC: 47 MG/DL
HGB BLD-MCNC: 13 GM/DL (ref 14–18)
INR PPP: 0.98 (ref 0.85–1.13)
LDLC SERPL CALC-MCNC: 104 MG/DL
MCH RBC QN AUTO: 26.5 PG (ref 26–33)
MCHC RBC AUTO-ENTMCNC: 31.6 GM/DL (ref 32.2–35.5)
MCV RBC AUTO: 83.9 FL (ref 80–94)
PLATELET # BLD AUTO: 255 THOU/MM3 (ref 130–400)
PMV BLD AUTO: 9.5 FL (ref 9.4–12.4)
POTASSIUM SERPL-SCNC: 4.6 MEQ/L (ref 3.5–5.2)
RBC # BLD AUTO: 4.9 MILL/MM3 (ref 4.7–6.1)
RH FACTOR: NORMAL
SODIUM SERPL-SCNC: 140 MEQ/L (ref 135–145)
TRIGL SERPL-MCNC: 157 MG/DL (ref 0–199)
WBC # BLD AUTO: 5 THOU/MM3 (ref 4.8–10.8)

## 2023-11-10 PROCEDURE — 93005 ELECTROCARDIOGRAM TRACING: CPT | Performed by: INTERNAL MEDICINE

## 2023-11-10 PROCEDURE — 93306 TTE W/DOPPLER COMPLETE: CPT

## 2023-11-10 PROCEDURE — 92929 HC PRQ CARD STENT W/ANGIO ADDL: CPT | Performed by: INTERNAL MEDICINE

## 2023-11-10 PROCEDURE — C1894 INTRO/SHEATH, NON-LASER: HCPCS | Performed by: INTERNAL MEDICINE

## 2023-11-10 PROCEDURE — 92928 PRQ TCAT PLMT NTRAC ST 1 LES: CPT | Performed by: INTERNAL MEDICINE

## 2023-11-10 PROCEDURE — 6360000002 HC RX W HCPCS: Performed by: INTERNAL MEDICINE

## 2023-11-10 PROCEDURE — 83036 HEMOGLOBIN GLYCOSYLATED A1C: CPT

## 2023-11-10 PROCEDURE — 80061 LIPID PANEL: CPT

## 2023-11-10 PROCEDURE — 80048 BASIC METABOLIC PNL TOTAL CA: CPT

## 2023-11-10 PROCEDURE — 36415 COLL VENOUS BLD VENIPUNCTURE: CPT

## 2023-11-10 PROCEDURE — 2580000003 HC RX 258: Performed by: NURSE PRACTITIONER

## 2023-11-10 PROCEDURE — 93010 ELECTROCARDIOGRAM REPORT: CPT | Performed by: INTERNAL MEDICINE

## 2023-11-10 PROCEDURE — 93005 ELECTROCARDIOGRAM TRACING: CPT | Performed by: NURSE PRACTITIONER

## 2023-11-10 PROCEDURE — C1725 CATH, TRANSLUMIN NON-LASER: HCPCS | Performed by: INTERNAL MEDICINE

## 2023-11-10 PROCEDURE — 93306 TTE W/DOPPLER COMPLETE: CPT | Performed by: INTERNAL MEDICINE

## 2023-11-10 PROCEDURE — 2500000003 HC RX 250 WO HCPCS: Performed by: INTERNAL MEDICINE

## 2023-11-10 PROCEDURE — 6370000000 HC RX 637 (ALT 250 FOR IP): Performed by: INTERNAL MEDICINE

## 2023-11-10 PROCEDURE — C1874 STENT, COATED/COV W/DEL SYS: HCPCS | Performed by: INTERNAL MEDICINE

## 2023-11-10 PROCEDURE — 99153 MOD SED SAME PHYS/QHP EA: CPT | Performed by: INTERNAL MEDICINE

## 2023-11-10 PROCEDURE — 86850 RBC ANTIBODY SCREEN: CPT

## 2023-11-10 PROCEDURE — 99152 MOD SED SAME PHYS/QHP 5/>YRS: CPT | Performed by: INTERNAL MEDICINE

## 2023-11-10 PROCEDURE — 86900 BLOOD TYPING SEROLOGIC ABO: CPT

## 2023-11-10 PROCEDURE — C1887 CATHETER, GUIDING: HCPCS | Performed by: INTERNAL MEDICINE

## 2023-11-10 PROCEDURE — 93458 L HRT ARTERY/VENTRICLE ANGIO: CPT | Performed by: INTERNAL MEDICINE

## 2023-11-10 PROCEDURE — 86901 BLOOD TYPING SEROLOGIC RH(D): CPT

## 2023-11-10 PROCEDURE — 6360000004 HC RX CONTRAST MEDICATION: Performed by: INTERNAL MEDICINE

## 2023-11-10 PROCEDURE — C1769 GUIDE WIRE: HCPCS | Performed by: INTERNAL MEDICINE

## 2023-11-10 PROCEDURE — 85610 PROTHROMBIN TIME: CPT

## 2023-11-10 PROCEDURE — 85347 COAGULATION TIME ACTIVATED: CPT

## 2023-11-10 PROCEDURE — 2580000003 HC RX 258: Performed by: INTERNAL MEDICINE

## 2023-11-10 PROCEDURE — 85027 COMPLETE CBC AUTOMATED: CPT

## 2023-11-10 PROCEDURE — 2709999900 HC NON-CHARGEABLE SUPPLY: Performed by: INTERNAL MEDICINE

## 2023-11-10 PROCEDURE — 92929 PR PRQ TRLUML CORONARY STENT W/ANGIO ADDL ART/BRNCH: CPT | Performed by: INTERNAL MEDICINE

## 2023-11-10 RX ORDER — CYCLOBENZAPRINE HCL 10 MG
10 TABLET ORAL 3 TIMES DAILY PRN
Status: DISCONTINUED | OUTPATIENT
Start: 2023-11-10 | End: 2023-11-11 | Stop reason: HOSPADM

## 2023-11-10 RX ORDER — INSULIN GLARGINE 100 [IU]/ML
80 INJECTION, SOLUTION SUBCUTANEOUS NIGHTLY
Status: DISCONTINUED | OUTPATIENT
Start: 2023-11-10 | End: 2023-11-11 | Stop reason: HOSPADM

## 2023-11-10 RX ORDER — SODIUM CHLORIDE 9 MG/ML
INJECTION, SOLUTION INTRAVENOUS PRN
Status: DISCONTINUED | OUTPATIENT
Start: 2023-11-10 | End: 2023-11-10 | Stop reason: SDUPTHER

## 2023-11-10 RX ORDER — MIDAZOLAM HYDROCHLORIDE 1 MG/ML
INJECTION INTRAMUSCULAR; INTRAVENOUS PRN
Status: DISCONTINUED | OUTPATIENT
Start: 2023-11-10 | End: 2023-11-10 | Stop reason: HOSPADM

## 2023-11-10 RX ORDER — GABAPENTIN 300 MG/1
300 CAPSULE ORAL 3 TIMES DAILY
Status: DISCONTINUED | OUTPATIENT
Start: 2023-11-10 | End: 2023-11-11 | Stop reason: HOSPADM

## 2023-11-10 RX ORDER — NITROGLYCERIN 0.4 MG/1
0.4 TABLET SUBLINGUAL EVERY 5 MIN PRN
Status: DISCONTINUED | OUTPATIENT
Start: 2023-11-10 | End: 2023-11-11 | Stop reason: HOSPADM

## 2023-11-10 RX ORDER — FENTANYL CITRATE 50 UG/ML
INJECTION, SOLUTION INTRAMUSCULAR; INTRAVENOUS PRN
Status: DISCONTINUED | OUTPATIENT
Start: 2023-11-10 | End: 2023-11-10 | Stop reason: HOSPADM

## 2023-11-10 RX ORDER — ASPIRIN 81 MG/1
81 TABLET ORAL DAILY
Status: DISCONTINUED | OUTPATIENT
Start: 2023-11-11 | End: 2023-11-11 | Stop reason: HOSPADM

## 2023-11-10 RX ORDER — DEXTROSE MONOHYDRATE 100 MG/ML
INJECTION, SOLUTION INTRAVENOUS CONTINUOUS PRN
Status: DISCONTINUED | OUTPATIENT
Start: 2023-11-10 | End: 2023-11-11 | Stop reason: HOSPADM

## 2023-11-10 RX ORDER — HEPARIN SODIUM 1000 [USP'U]/ML
INJECTION, SOLUTION INTRAVENOUS; SUBCUTANEOUS PRN
Status: DISCONTINUED | OUTPATIENT
Start: 2023-11-10 | End: 2023-11-10 | Stop reason: HOSPADM

## 2023-11-10 RX ORDER — ASPIRIN 325 MG
325 TABLET ORAL ONCE
Status: DISCONTINUED | OUTPATIENT
Start: 2023-11-10 | End: 2023-11-10

## 2023-11-10 RX ORDER — SODIUM CHLORIDE 9 MG/ML
INJECTION, SOLUTION INTRAVENOUS PRN
Status: DISCONTINUED | OUTPATIENT
Start: 2023-11-10 | End: 2023-11-11 | Stop reason: HOSPADM

## 2023-11-10 RX ORDER — SODIUM CHLORIDE 0.9 % (FLUSH) 0.9 %
5-40 SYRINGE (ML) INJECTION EVERY 12 HOURS SCHEDULED
Status: DISCONTINUED | OUTPATIENT
Start: 2023-11-10 | End: 2023-11-11 | Stop reason: HOSPADM

## 2023-11-10 RX ORDER — LISINOPRIL 10 MG/1
10 TABLET ORAL DAILY
Status: DISCONTINUED | OUTPATIENT
Start: 2023-11-11 | End: 2023-11-11 | Stop reason: HOSPADM

## 2023-11-10 RX ORDER — PANTOPRAZOLE SODIUM 40 MG/1
40 TABLET, DELAYED RELEASE ORAL
Status: DISCONTINUED | OUTPATIENT
Start: 2023-11-11 | End: 2023-11-11 | Stop reason: HOSPADM

## 2023-11-10 RX ORDER — ALLOPURINOL 100 MG/1
100 TABLET ORAL DAILY
Status: DISCONTINUED | OUTPATIENT
Start: 2023-11-10 | End: 2023-11-11 | Stop reason: HOSPADM

## 2023-11-10 RX ORDER — SODIUM CHLORIDE 0.9 % (FLUSH) 0.9 %
5-40 SYRINGE (ML) INJECTION EVERY 12 HOURS SCHEDULED
Status: DISCONTINUED | OUTPATIENT
Start: 2023-11-10 | End: 2023-11-10 | Stop reason: SDUPTHER

## 2023-11-10 RX ORDER — INSULIN LISPRO 100 [IU]/ML
4 INJECTION, SOLUTION INTRAVENOUS; SUBCUTANEOUS
Status: DISCONTINUED | OUTPATIENT
Start: 2023-11-11 | End: 2023-11-11 | Stop reason: HOSPADM

## 2023-11-10 RX ORDER — SODIUM CHLORIDE 0.9 % (FLUSH) 0.9 %
5-40 SYRINGE (ML) INJECTION PRN
Status: DISCONTINUED | OUTPATIENT
Start: 2023-11-10 | End: 2023-11-11 | Stop reason: HOSPADM

## 2023-11-10 RX ORDER — SODIUM CHLORIDE 9 MG/ML
INJECTION, SOLUTION INTRAVENOUS CONTINUOUS
Status: DISCONTINUED | OUTPATIENT
Start: 2023-11-10 | End: 2023-11-11 | Stop reason: HOSPADM

## 2023-11-10 RX ORDER — ISOSORBIDE MONONITRATE 30 MG/1
30 TABLET, EXTENDED RELEASE ORAL DAILY
Status: DISCONTINUED | OUTPATIENT
Start: 2023-11-11 | End: 2023-11-11 | Stop reason: HOSPADM

## 2023-11-10 RX ORDER — PHENYLEPHRINE HYDROCHLORIDE 10 MG/ML
INJECTION INTRAVENOUS PRN
Status: DISCONTINUED | OUTPATIENT
Start: 2023-11-10 | End: 2023-11-10 | Stop reason: HOSPADM

## 2023-11-10 RX ORDER — ACETAMINOPHEN 325 MG/1
650 TABLET ORAL EVERY 4 HOURS PRN
Status: DISCONTINUED | OUTPATIENT
Start: 2023-11-10 | End: 2023-11-11 | Stop reason: HOSPADM

## 2023-11-10 RX ORDER — SODIUM CHLORIDE 0.9 % (FLUSH) 0.9 %
5-40 SYRINGE (ML) INJECTION PRN
Status: DISCONTINUED | OUTPATIENT
Start: 2023-11-10 | End: 2023-11-10 | Stop reason: SDUPTHER

## 2023-11-10 RX ORDER — SODIUM CHLORIDE 9 MG/ML
INJECTION, SOLUTION INTRAVENOUS CONTINUOUS
Status: DISCONTINUED | OUTPATIENT
Start: 2023-11-10 | End: 2023-11-10 | Stop reason: SDUPTHER

## 2023-11-10 RX ADMIN — GABAPENTIN 300 MG: 300 CAPSULE ORAL at 21:59

## 2023-11-10 RX ADMIN — SODIUM CHLORIDE: 9 INJECTION, SOLUTION INTRAVENOUS at 22:41

## 2023-11-10 RX ADMIN — SODIUM CHLORIDE: 9 INJECTION, SOLUTION INTRAVENOUS at 09:45

## 2023-11-10 RX ADMIN — ALLOPURINOL 100 MG: 100 TABLET ORAL at 21:59

## 2023-11-10 RX ADMIN — METOPROLOL TARTRATE 25 MG: 25 TABLET, FILM COATED ORAL at 21:59

## 2023-11-10 RX ADMIN — TICAGRELOR 90 MG: 90 TABLET ORAL at 22:03

## 2023-11-10 RX ADMIN — INSULIN GLARGINE 80 UNITS: 100 INJECTION, SOLUTION SUBCUTANEOUS at 21:59

## 2023-11-10 NOTE — H&P
[facility]  Sedation/Analgesia History & Physical    Pt Name: Mariah Irving  MRN: [id number]  YOB: 1965  Provider Performing Procedure: Myra Espinal MD  Primary Care Physician: STEVE Lua CNP    PRE-PROCEDURE   DNR-CCA/DNR-CC []Yes [x]No  Brief History/Pre-Procedure Diagnosis:   Chest pain on exertion CCS class III and abn nuc stress test  Need cardiac cath    The risk and benefit of left heart cath has been explain in detail including but not limited to  Bleeding including retroperitoneal bleed 1%, infection, MI, CVA, HANS, Limb loss, dissection, allergic reaction,death  Each of them 1 in 2000 range. The alternative managment has been explained. Patient expressed understanding of the risk and benefit and of the alternative managment well. Patient wanted and agreed to proceed with left heart cath. Hence we will schedule him for [facility]               MEDICAL HISTORY  []CAD/Valve  []Liver Disease  []Lung Disease []Diabetes  []Hypertension []Renal Disease  []Additional information:       has a past medical history of Arthritis, Diabetes mellitus ([address]), Hypertension, and Kidney disease. SURGICAL HISTORY   has a past surgical history that includes Appendectomy; eye surgery; and Cataract removal (Left).   Additional information:       ALLERGIES   Allergies as of 10/31/2023    (No Known Allergies)     Additional information:       MEDICATIONS   Coumadin Use Last 5 Days [x]No []Yes  Antiplatelet drug therapy use last 5 days  []No [x]Yes  Other anticoagulant use last 5 days  [x]No []Yes    Current Facility-Administered Medications:     sodium chloride flush 0.9 % injection 5-40 mL, 5-40 mL, IntraVENous, 2 times per day, Perla Godoy APRN - CNP    sodium chloride flush 0.9 % injection 5-40 mL, 5-40 mL, IntraVENous, PRN, Perla Godoy APRN - CNP    0.9 % sodium chloride infusion, , IntraVENous, PRN, Perla Godoy APRN - CNP    aspirin tablet 325 mg, 325 mg, Oral, Once, Perla Godoy APRN - CNP    nitroGLYCERIN (NITROSTAT) SL tablet 0.4 mg, 0.4 mg, SubLINGual, Q5 Min PRN, Perla Godoy APRN - CNP    0.9 % sodium chloride infusion, , IntraVENous, Continuous, Perla Godoy APRN - CNP, Last Rate: 50 mL/hr at 11/10/23 0945, New Bag at 11/10/23 0945  Prior to Admission medications    Medication Sig Start Date End Date Taking? Authorizing Provider   allopurinol (ZYLOPRIM) 100 MG tablet Take 1 tablet by mouth daily    Juliette Pfeiffer MD   Insulin Glargine (BASAGLAR KWIKPEN SC) Inject 80 Units into the skin nightly    Juliette Pfeiffer MD   cyclobenzaprine (FLEXERIL) 10 MG tablet Take 1 tablet by mouth 3 times daily as needed for Muscle spasms    Juliette Pfeiffer MD   Continuous Blood Gluc Sensor (DEXCOM G6 SENSOR) MISC by Does not apply route    Juliette Pfeiffer MD   Insulin Pen Needle (EASY TOUCH PEN NEEDLES) 31G X 5 MM MISC 1 each by Does not apply route daily    Juliette Pfeiffer MD   gabapentin (NEURONTIN) 300 MG capsule Take 1 capsule by mouth 3 times daily. Juliette Pfeiffer MD   hydroCHLOROthiazide (HYDRODIURIL) 25 MG tablet Take 1 tablet by mouth daily    Juliette Pfeiffer MD   metFORMIN (GLUCOPHAGE) 500 MG tablet Take 1 tablet by mouth 2 times daily (with meals)    Juliette Pfeiffer MD   Insulin Regular Human (NOVOLIN R FLEXPEN IJ) Inject 4 Units as directed 3 times daily (with meals) Scale for meals    Juliette Pfeiffer MD   omeprazole (PRILOSEC) 40 MG delayed release capsule Take 1 capsule by mouth daily    Juliette Pfeiffer MD   aspirin 81 MG EC tablet Take 1 tablet by mouth daily 10/19/23   Glo Mayorga MD   nitroGLYCERIN (NITROSTAT) 0.4 MG SL tablet Place 1 tablet under the tongue every 5 minutes as needed for Chest pain up to max of 3 total doses. If no relief after 1 dose, call 911.   Patient not taking: Reported on 11/10/2023 10/19/23   Glo Mayorga MD   metoprolol tartrate

## 2023-11-10 NOTE — FLOWSHEET NOTE
1253 report received from [facility] at bedside. Patient up to restroom. Iv at 75 ml/hr    1513 patient to lab in stable condition. 1705 patient received from cath lab. Vasc band to right wrist with 8 ml of air in band. Patient alert , obeys commands. 1905 first 2 ml of air from band. 1920 second 2 ml of air from band. 1935 3rd 2ml of air from band.      1940 called report to Yolette GRISSOM

## 2023-11-10 NOTE — DISCHARGE INSTRUCTIONS
F/u with DR London Hodgson on 11/27. Do not stop brilinta or aspirin without talking to your doctor. Post Procedure Care - Radial Site    Home Care    * Keep procedure site clean and dry. * You may shower tomorrow  * Wash site gently with soap and water, pat the area dry and cover with a bandaid daily for the next 5days  * Monitor site for signs of bleeding, swelling or infection. * Do not use any powders, lotions or creams in the area of the procedure until the site is healed. * Do not soak in any pools of water until site is healed, such as dish water, a bathtub, hot tub or swimming pool (generally 5-7 days). * The access site may appear bruised. It is normal to have some discoloration at the site. If the discoloration is enlarging or is larger than the palm of your hand, call the Physicians office    * It is normal to have some tenderness or soreness at the site of the procedure. This may be relieved with the use of tylenol. Physical Activity     * Minimize movement of right hand/wrist for 24 hours  * Maintain armboard for 24 hours  * Limit activity times 2 days. * No driving times 2 days. * No lifting greater than 5 pounds, pushing or pulling with right arm for 72 hours      Call 911 if   You are bleeding from the area where the catheter was put in your artery. Immediately hold firm pressure on the site, lie down and call 911 to transport you to nearest emergency room. You have a fast-growing, painful lump at the catheter site. Immediately hold firm pressure on site, lie down and call 911 to transport you to nearest emergency room. If you notice numbness, tingling, coldness or loss of feeling in the extremity on the side of the procedure, CALL THE PHYSICIAN IMMEDIATELY or 911   Notify office if you have signs of infection, such as: Increased pain, swelling, warmth, or redness. Red streaks leading from the catheter site. Pus draining from the catheter site.   If you develop a fever within 2-3 days after your procedure

## 2023-11-10 NOTE — PROGRESS NOTES
Patient admitted to 2E04  Ambulatory for cardiac cath. Patient NPO. Patient accompanied by  spouse. Vital signs obtained. Assessment and data collection intiated. Oriented to room. Policies and procedures for 2E explained. All questions answered with no further questions at this time. Fall prevention and safety precautions discussed with patient.

## 2023-11-11 VITALS
RESPIRATION RATE: 18 BRPM | HEART RATE: 76 BPM | BODY MASS INDEX: 31.27 KG/M2 | WEIGHT: 251.5 LBS | OXYGEN SATURATION: 100 % | HEIGHT: 75 IN | SYSTOLIC BLOOD PRESSURE: 143 MMHG | TEMPERATURE: 98.2 F | DIASTOLIC BLOOD PRESSURE: 67 MMHG

## 2023-11-11 LAB
ANION GAP SERPL CALC-SCNC: 13 MEQ/L (ref 8–16)
BUN SERPL-MCNC: 16 MG/DL (ref 7–22)
CALCIUM SERPL-MCNC: 8.9 MG/DL (ref 8.5–10.5)
CHLORIDE SERPL-SCNC: 106 MEQ/L (ref 98–111)
CO2 SERPL-SCNC: 21 MEQ/L (ref 23–33)
CREAT SERPL-MCNC: 1.3 MG/DL (ref 0.4–1.2)
DEPRECATED RDW RBC AUTO: 44.3 FL (ref 35–45)
ERYTHROCYTE [DISTWIDTH] IN BLOOD BY AUTOMATED COUNT: 14.3 % (ref 11.5–14.5)
GFR SERPL CREATININE-BSD FRML MDRD: > 60 ML/MIN/1.73M2
GLUCOSE SERPL-MCNC: 260 MG/DL (ref 70–108)
HCT VFR BLD AUTO: 38.7 % (ref 42–52)
HGB BLD-MCNC: 12.2 GM/DL (ref 14–18)
MCH RBC QN AUTO: 27 PG (ref 26–33)
MCHC RBC AUTO-ENTMCNC: 31.5 GM/DL (ref 32.2–35.5)
MCV RBC AUTO: 85.6 FL (ref 80–94)
PLATELET # BLD AUTO: 236 THOU/MM3 (ref 130–400)
PMV BLD AUTO: 10.4 FL (ref 9.4–12.4)
POTASSIUM SERPL-SCNC: 4.2 MEQ/L (ref 3.5–5.2)
RBC # BLD AUTO: 4.52 MILL/MM3 (ref 4.7–6.1)
SODIUM SERPL-SCNC: 140 MEQ/L (ref 135–145)
WBC # BLD AUTO: 6.4 THOU/MM3 (ref 4.8–10.8)

## 2023-11-11 PROCEDURE — 80048 BASIC METABOLIC PNL TOTAL CA: CPT

## 2023-11-11 PROCEDURE — 93005 ELECTROCARDIOGRAM TRACING: CPT

## 2023-11-11 PROCEDURE — 85027 COMPLETE CBC AUTOMATED: CPT

## 2023-11-11 PROCEDURE — 36415 COLL VENOUS BLD VENIPUNCTURE: CPT

## 2023-11-11 PROCEDURE — 99214 OFFICE O/P EST MOD 30 MIN: CPT | Performed by: STUDENT IN AN ORGANIZED HEALTH CARE EDUCATION/TRAINING PROGRAM

## 2023-11-11 PROCEDURE — 6370000000 HC RX 637 (ALT 250 FOR IP): Performed by: INTERNAL MEDICINE

## 2023-11-11 RX ORDER — ATORVASTATIN CALCIUM 40 MG/1
40 TABLET, FILM COATED ORAL NIGHTLY
Qty: 30 TABLET | Refills: 3 | Status: SHIPPED | OUTPATIENT
Start: 2023-11-11

## 2023-11-11 RX ORDER — ATORVASTATIN CALCIUM 40 MG/1
40 TABLET, FILM COATED ORAL NIGHTLY
Status: DISCONTINUED | OUTPATIENT
Start: 2023-11-11 | End: 2023-11-11 | Stop reason: HOSPADM

## 2023-11-11 RX ORDER — PANTOPRAZOLE SODIUM 40 MG/1
40 TABLET, DELAYED RELEASE ORAL
Qty: 30 TABLET | Refills: 3 | Status: SHIPPED | OUTPATIENT
Start: 2023-11-12

## 2023-11-11 RX ADMIN — PANTOPRAZOLE SODIUM 40 MG: 40 TABLET, DELAYED RELEASE ORAL at 05:48

## 2023-11-11 RX ADMIN — GABAPENTIN 300 MG: 300 CAPSULE ORAL at 09:47

## 2023-11-11 RX ADMIN — ALLOPURINOL 100 MG: 100 TABLET ORAL at 09:47

## 2023-11-11 RX ADMIN — TICAGRELOR 90 MG: 90 TABLET ORAL at 09:47

## 2023-11-11 RX ADMIN — ISOSORBIDE MONONITRATE 30 MG: 30 TABLET, EXTENDED RELEASE ORAL at 09:47

## 2023-11-11 RX ADMIN — LISINOPRIL 10 MG: 10 TABLET ORAL at 09:47

## 2023-11-11 RX ADMIN — METOPROLOL TARTRATE 25 MG: 25 TABLET, FILM COATED ORAL at 09:47

## 2023-11-11 RX ADMIN — INSULIN LISPRO 4 UNITS: 100 INJECTION, SOLUTION INTRAVENOUS; SUBCUTANEOUS at 09:50

## 2023-11-11 RX ADMIN — ASPIRIN 81 MG: 81 TABLET, COATED ORAL at 09:47

## 2023-11-11 NOTE — PLAN OF CARE
Problem: Chronic Conditions and Co-morbidities  Goal: Patient's chronic conditions and co-morbidity symptoms are monitored and maintained or improved  Outcome: Progressing  Flowsheets (Taken 11/11/2023 0200)  Care Plan - Patient's Chronic Conditions and Co-Morbidity Symptoms are Monitored and Maintained or Improved: Monitor and assess patient's chronic conditions and comorbid symptoms for stability, deterioration, or improvement     Problem: Discharge Planning  Goal: Discharge to home or other facility with appropriate resources  Outcome: Progressing  Flowsheets (Taken 11/11/2023 0200)  Discharge to home or other facility with appropriate resources:   Identify barriers to discharge with patient and caregiver   Identify discharge learning needs (meds, wound care, etc)   Refer to discharge planning if patient needs post-hospital services based on physician order or complex needs related to functional status, cognitive ability or social support system     Problem: Safety - Adult  Goal: Free from fall injury  Outcome: Progressing  Flowsheets (Taken 11/11/2023 0200)  Free From Fall Injury: Based on caregiver fall risk screen, instruct family/caregiver to ask for assistance with transferring infant if caregiver noted to have fall risk factors     Problem: Infection - Adult  Goal: Absence of infection during hospitalization  Outcome: Progressing  Flowsheets (Taken 11/11/2023 0200)  Absence of infection during hospitalization:   Assess and monitor for signs and symptoms of infection   Monitor lab/diagnostic results   Monitor all insertion sites i.e., indwelling lines, tubes and drains   Care plan reviewed with patient and verbalize understanding of the plan of care and contribute to goal setting.

## 2023-11-12 LAB
EKG ATRIAL RATE: 59 BPM
EKG P AXIS: 57 DEGREES
EKG P-R INTERVAL: 194 MS
EKG Q-T INTERVAL: 408 MS
EKG QRS DURATION: 90 MS
EKG QTC CALCULATION (BAZETT): 403 MS
EKG R AXIS: 42 DEGREES
EKG T AXIS: 37 DEGREES
EKG VENTRICULAR RATE: 59 BPM

## 2023-11-12 PROCEDURE — 93010 ELECTROCARDIOGRAM REPORT: CPT | Performed by: INTERNAL MEDICINE

## 2023-11-19 LAB
EKG ATRIAL RATE: 53 BPM
EKG ATRIAL RATE: 58 BPM
EKG ATRIAL RATE: 59 BPM
EKG P AXIS: 41 DEGREES
EKG P AXIS: 57 DEGREES
EKG P AXIS: 69 DEGREES
EKG P-R INTERVAL: 186 MS
EKG P-R INTERVAL: 194 MS
EKG P-R INTERVAL: 198 MS
EKG Q-T INTERVAL: 408 MS
EKG Q-T INTERVAL: 418 MS
EKG Q-T INTERVAL: 436 MS
EKG QRS DURATION: 84 MS
EKG QRS DURATION: 90 MS
EKG QRS DURATION: 92 MS
EKG QTC CALCULATION (BAZETT): 392 MS
EKG QTC CALCULATION (BAZETT): 403 MS
EKG QTC CALCULATION (BAZETT): 428 MS
EKG R AXIS: 35 DEGREES
EKG R AXIS: 42 DEGREES
EKG R AXIS: 72 DEGREES
EKG T AXIS: 37 DEGREES
EKG T AXIS: 43 DEGREES
EKG T AXIS: 44 DEGREES
EKG VENTRICULAR RATE: 53 BPM
EKG VENTRICULAR RATE: 58 BPM
EKG VENTRICULAR RATE: 59 BPM

## 2023-11-27 ENCOUNTER — OFFICE VISIT (OUTPATIENT)
Dept: CARDIOLOGY CLINIC | Age: 58
End: 2023-11-27
Payer: COMMERCIAL

## 2023-11-27 VITALS
SYSTOLIC BLOOD PRESSURE: 128 MMHG | HEIGHT: 75 IN | HEART RATE: 78 BPM | BODY MASS INDEX: 30.66 KG/M2 | DIASTOLIC BLOOD PRESSURE: 81 MMHG | WEIGHT: 246.6 LBS

## 2023-11-27 DIAGNOSIS — I10 PRIMARY HYPERTENSION: ICD-10-CM

## 2023-11-27 DIAGNOSIS — Z95.820 STATUS POST ANGIOPLASTY WITH STENT: ICD-10-CM

## 2023-11-27 DIAGNOSIS — I25.10 CORONARY ARTERY DISEASE INVOLVING NATIVE CORONARY ARTERY OF NATIVE HEART WITHOUT ANGINA PECTORIS: Primary | ICD-10-CM

## 2023-11-27 DIAGNOSIS — E78.5 DYSLIPIDEMIA: ICD-10-CM

## 2023-11-27 DIAGNOSIS — R06.02 SOB (SHORTNESS OF BREATH) ON EXERTION: ICD-10-CM

## 2023-11-27 PROBLEM — R07.9 CHEST PAIN ON EXERTION: Status: RESOLVED | Noted: 2023-10-19 | Resolved: 2023-11-27

## 2023-11-27 PROBLEM — R94.39 ABNORMAL STRESS TEST: Status: RESOLVED | Noted: 2023-10-19 | Resolved: 2023-11-27

## 2023-11-27 PROCEDURE — 3079F DIAST BP 80-89 MM HG: CPT | Performed by: INTERNAL MEDICINE

## 2023-11-27 PROCEDURE — 99214 OFFICE O/P EST MOD 30 MIN: CPT | Performed by: INTERNAL MEDICINE

## 2023-11-27 PROCEDURE — 3074F SYST BP LT 130 MM HG: CPT | Performed by: INTERNAL MEDICINE

## 2023-11-27 RX ORDER — PRASUGREL 10 MG/1
30 TABLET, FILM COATED ORAL DAILY
COMMUNITY

## 2023-11-27 RX ORDER — PRASUGREL 10 MG/1
TABLET, FILM COATED ORAL
Qty: 90 TABLET | Refills: 3 | Status: SHIPPED | OUTPATIENT
Start: 2023-11-27 | End: 2023-11-27

## 2023-11-27 RX ORDER — PRASUGREL 10 MG/1
10 TABLET, FILM COATED ORAL DAILY
COMMUNITY

## 2023-11-27 NOTE — PROGRESS NOTES
10/19/23  Sinus  Rhythm   WITHIN NORMAL LIMITS      Assessment     Diagnosis Orders   1. Coronary artery disease involving native coronary artery of native heart without angina pectoris        2. Primary hypertension        3. Dyslipidemia        4. Status post angioplasty with stent of the lAD        5. SOB (shortness of breath) on exertion              CKD III      Plan   MEDS AND LABS REVIEWED      Continue the current treatment and with constant vigilance to changes in symptoms and also any potential side effects. Return for care or seek medical attention immediately if symptoms got worse and/or develop new symptoms. Coronary artery disease, seems to be stable. Denies angina or change in breathing pattern  Sob on exertion  Risk factors  Echo wnl  CONT  asa 81  CAN NOT AFFORD BRILINTA  CHANGE TO EFFIENT  AND NO HX OF cva  CONT  lopressor 25 po bid  CONT  lisinopril 10 mg po qd   WANT TO BE BACK ON CIALIS  STOP imdur 30 mg po qd   STOP Sl NGT as needed    PAT  FEEL TIRED AT REST AND DON'T FEEL LIKE IT  Pt AND CONSIDER FURTHER  PCP    Hypertension, on medical treatment. Seems to be under good control. Patient is compliant with medical treatment. Lipid panel and liver function test before next appointment    I have reviewed the provider's instructions with the patient, answering all questions to his satisfaction. The patient is advised to attempt to improve diet and exercise patterns to aid in medical management of this problem. Advised more plant based nutrition/meditarrean diet   Advised patient to call office or seek immediate medical attention if there is any new onset of  any chest pain, sob, palpitations, lightheadedness, dizziness, orthopnea, PND or pedal edema. All medication side effects were discussed in details.       RTC in 5 month        Loyd Stapleton, [facility]

## 2023-11-30 ENCOUNTER — TELEPHONE (OUTPATIENT)
Dept: CARDIAC REHAB | Age: 58
End: 2023-11-30

## 2023-11-30 NOTE — TELEPHONE ENCOUNTER
Patient returned call. Patient authorized for order to be sent to cardiac rehab in [facility]. Order will be sent to [facility] cardiac rehab.

## 2023-11-30 NOTE — TELEPHONE ENCOUNTER
Received order for cardiac rehab. Attempted to call Fina Lainez to discuss program, no answer, left message.

## 2024-01-15 RX ORDER — LISINOPRIL 10 MG/1
10 TABLET ORAL DAILY
Qty: 90 TABLET | Refills: 1 | Status: SHIPPED | OUTPATIENT
Start: 2024-01-15

## 2024-02-05 RX ORDER — ATORVASTATIN CALCIUM 40 MG/1
40 TABLET, FILM COATED ORAL NIGHTLY
Qty: 30 TABLET | Refills: 2 | Status: SHIPPED | OUTPATIENT
Start: 2024-02-05

## 2024-04-15 RX ORDER — ATORVASTATIN CALCIUM 40 MG/1
40 TABLET, FILM COATED ORAL NIGHTLY
Qty: 30 TABLET | Refills: 0 | Status: SHIPPED | OUTPATIENT
Start: 2024-04-15

## 2024-05-28 ENCOUNTER — OFFICE VISIT (OUTPATIENT)
Dept: CARDIOLOGY CLINIC | Age: 59
End: 2024-05-28
Payer: COMMERCIAL

## 2024-05-28 VITALS
WEIGHT: 254.4 LBS | SYSTOLIC BLOOD PRESSURE: 125 MMHG | DIASTOLIC BLOOD PRESSURE: 75 MMHG | HEIGHT: 75 IN | HEART RATE: 68 BPM | BODY MASS INDEX: 31.63 KG/M2

## 2024-05-28 DIAGNOSIS — Z82.49 FAMILY HISTORY OF PREMATURE CAD: ICD-10-CM

## 2024-05-28 DIAGNOSIS — I10 PRIMARY HYPERTENSION: ICD-10-CM

## 2024-05-28 DIAGNOSIS — R06.02 SOB (SHORTNESS OF BREATH) ON EXERTION: ICD-10-CM

## 2024-05-28 DIAGNOSIS — E78.5 DYSLIPIDEMIA: ICD-10-CM

## 2024-05-28 DIAGNOSIS — I25.10 CORONARY ARTERY DISEASE INVOLVING NATIVE CORONARY ARTERY OF NATIVE HEART WITHOUT ANGINA PECTORIS: Primary | ICD-10-CM

## 2024-05-28 DIAGNOSIS — Z95.820 STATUS POST ANGIOPLASTY WITH STENT: ICD-10-CM

## 2024-05-28 PROCEDURE — 3078F DIAST BP <80 MM HG: CPT | Performed by: INTERNAL MEDICINE

## 2024-05-28 PROCEDURE — 3074F SYST BP LT 130 MM HG: CPT | Performed by: INTERNAL MEDICINE

## 2024-05-28 PROCEDURE — 99214 OFFICE O/P EST MOD 30 MIN: CPT | Performed by: INTERNAL MEDICINE

## 2024-05-28 RX ORDER — ATORVASTATIN CALCIUM 40 MG/1
40 TABLET, FILM COATED ORAL NIGHTLY
Qty: 90 TABLET | Refills: 0 | Status: SHIPPED | OUTPATIENT
Start: 2024-05-28

## 2024-05-28 NOTE — PROGRESS NOTES
Chief Complaint   Patient presents with    Follow-up    Coronary Artery Disease       originally here - abnormal stress test and cp on exertion      5 month follow up.    EKG done 2023.    Denies cp, palpitations, dizziness and ALEXANDRA.    No more chest pain after stent    Sob on exertion better     PMHX  DM for 15 yrs    Nonsmoker    Quit 32 yrs back    FHx  Mother had pacer  Grabdfather had mi in his 40's    Past Surgical History:   Procedure Laterality Date    APPENDECTOMY      CARDIAC PROCEDURE N/A 11/10/2023    Left heart cath / coronary angiography performed by Avis Carlisle MD at [facility] CARDIAC CATH LAB    CARDIAC PROCEDURE N/A 11/10/2023    Percutaneous coronary intervention performed by Garcia Olivares MD at [facility] CARDIAC CATH LAB    CATARACT REMOVAL Left     EYE SURGERY         No Known Allergies     Family History   Problem Relation Age of Onset    Heart Disease Mother         pacer    COPD Mother     Cancer Father     Diabetes Sister     Diabetes Brother         Social History     Socioeconomic History    Marital status:      Spouse name: Not on file    Number of children: Not on file    Years of education: Not on file    Highest education level: Not on file   Occupational History    Not on file   Tobacco Use    Smoking status: Former     Current packs/day: 0.00     Types: Cigarettes     Quit date:      Years since quittin.4    Smokeless tobacco: Never   Vaping Use    Vaping Use: Never used   Substance and Sexual Activity    Alcohol use: Yes     Alcohol/week: 6.0 standard drinks of alcohol     Types: 6 Cans of beer per week    Drug use: Never    Sexual activity: Not on file   Other Topics Concern    Not on file   Social History Narrative    Not on file     Social Determinants of Health     Financial Resource Strain: Not on file   Food Insecurity: Not on file   Transportation Needs: Not on file   Physical Activity: Not on file   Stress: Not on file   Social Connections: Not on file

## 2024-07-03 ENCOUNTER — HOSPITAL ENCOUNTER (OUTPATIENT)
Dept: MRI IMAGING | Age: 59
Discharge: HOME | End: 2024-07-03
Attending: STUDENT IN AN ORGANIZED HEALTH CARE EDUCATION/TRAINING PROGRAM
Payer: COMMERCIAL

## 2024-07-03 DIAGNOSIS — M54.16 LUMBAR RADICULOPATHY: ICD-10-CM

## 2024-07-03 PROCEDURE — 72148 MRI LUMBAR SPINE W/O DYE: CPT

## 2024-07-08 ENCOUNTER — TELEPHONE (OUTPATIENT)
Dept: CARDIOLOGY CLINIC | Age: 59
End: 2024-07-08

## 2024-07-08 NOTE — TELEPHONE ENCOUNTER
Pt last seen by Dr. Carlisle 5-28-24.  Pt is needing clearance for Lumbar Epidural Steroid Injection on 7-29-24 with Dr. Razo.  Pt is on ASA and Effient.    Fax [phone]

## 2024-07-10 RX ORDER — LISINOPRIL 10 MG/1
10 TABLET ORAL DAILY
Qty: 90 TABLET | Refills: 2 | Status: SHIPPED | OUTPATIENT
Start: 2024-07-10

## 2024-09-12 RX ORDER — ATORVASTATIN CALCIUM 40 MG/1
40 TABLET, FILM COATED ORAL NIGHTLY
Qty: 90 TABLET | Refills: 10 | Status: SHIPPED | OUTPATIENT
Start: 2024-09-12

## 2024-11-18 RX ORDER — PRASUGREL 10 MG/1
TABLET, FILM COATED ORAL
Qty: 30 TABLET | Refills: 0 | Status: SHIPPED | OUTPATIENT
Start: 2024-11-18

## 2024-11-26 ENCOUNTER — OFFICE VISIT (OUTPATIENT)
Dept: CARDIOLOGY CLINIC | Age: 59
End: 2024-11-26
Payer: COMMERCIAL

## 2024-11-26 VITALS
BODY MASS INDEX: 31.58 KG/M2 | WEIGHT: 254 LBS | DIASTOLIC BLOOD PRESSURE: 72 MMHG | HEIGHT: 75 IN | SYSTOLIC BLOOD PRESSURE: 139 MMHG | HEART RATE: 77 BPM

## 2024-11-26 DIAGNOSIS — E78.5 DYSLIPIDEMIA: ICD-10-CM

## 2024-11-26 DIAGNOSIS — R06.02 SOB (SHORTNESS OF BREATH) ON EXERTION: ICD-10-CM

## 2024-11-26 DIAGNOSIS — I10 PRIMARY HYPERTENSION: ICD-10-CM

## 2024-11-26 DIAGNOSIS — N18.31 STAGE 3A CHRONIC KIDNEY DISEASE (HCC): ICD-10-CM

## 2024-11-26 DIAGNOSIS — I25.10 CORONARY ARTERY DISEASE INVOLVING NATIVE CORONARY ARTERY OF NATIVE HEART WITHOUT ANGINA PECTORIS: Primary | ICD-10-CM

## 2024-11-26 DIAGNOSIS — Z95.820 STATUS POST ANGIOPLASTY WITH STENT: ICD-10-CM

## 2024-11-26 PROCEDURE — 99214 OFFICE O/P EST MOD 30 MIN: CPT | Performed by: INTERNAL MEDICINE

## 2024-11-26 PROCEDURE — 3078F DIAST BP <80 MM HG: CPT | Performed by: INTERNAL MEDICINE

## 2024-11-26 PROCEDURE — 3075F SYST BP GE 130 - 139MM HG: CPT | Performed by: INTERNAL MEDICINE

## 2024-11-26 PROCEDURE — 93000 ELECTROCARDIOGRAM COMPLETE: CPT | Performed by: INTERNAL MEDICINE

## 2024-11-26 RX ORDER — ATORVASTATIN CALCIUM 40 MG/1
40 TABLET, FILM COATED ORAL NIGHTLY
Qty: 90 TABLET | Refills: 3 | Status: SHIPPED | OUTPATIENT
Start: 2024-11-26

## 2024-11-26 RX ORDER — METOPROLOL TARTRATE 25 MG/1
25 TABLET, FILM COATED ORAL 2 TIMES DAILY
Qty: 180 TABLET | Refills: 3 | Status: SHIPPED | OUTPATIENT
Start: 2024-11-26

## 2024-11-26 RX ORDER — LISINOPRIL 10 MG/1
10 TABLET ORAL DAILY
Qty: 90 TABLET | Refills: 3 | Status: SHIPPED | OUTPATIENT
Start: 2024-11-26

## 2024-11-26 RX ORDER — PRASUGREL 10 MG/1
10 TABLET, FILM COATED ORAL DAILY
Qty: 90 TABLET | Refills: 3 | Status: SHIPPED | OUTPATIENT
Start: 2024-11-26

## 2024-11-26 NOTE — PROGRESS NOTES
Chief Complaint   Patient presents with    6 Month Follow-Up    Coronary Artery Disease       originally here - abnormal stress test and cp on exertion            6 month follow up.    EKG done today.    Denies cp, palpitations, dizziness and ALEXANDRA.    No more chest pain after stent    Sob on exertion better     PMHX  DM for 15 yrs    Nonsmoker    Quit 32 yrs back    FHx  Mother had pacer  Grabdfather had mi in his 40's    Past Surgical History:   Procedure Laterality Date    APPENDECTOMY      CARDIAC PROCEDURE N/A 11/10/2023    Left heart cath / coronary angiography performed by Avis Carlisle MD at [facility] CARDIAC CATH LAB    CARDIAC PROCEDURE N/A 11/10/2023    Percutaneous coronary intervention performed by Garcia Olivares MD at [facility] CARDIAC CATH LAB    CATARACT REMOVAL Left     EYE SURGERY         No Known Allergies     Family History   Problem Relation Age of Onset    Heart Disease Mother         pacer    COPD Mother     Cancer Father     Diabetes Sister     Diabetes Brother         Social History     Socioeconomic History    Marital status:      Spouse name: Not on file    Number of children: Not on file    Years of education: Not on file    Highest education level: Not on file   Occupational History    Not on file   Tobacco Use    Smoking status: Former     Current packs/day: 0.00     Types: Cigarettes     Quit date:      Years since quittin.9    Smokeless tobacco: Never   Vaping Use    Vaping status: Never Used   Substance and Sexual Activity    Alcohol use: Yes     Alcohol/week: 6.0 standard drinks of alcohol     Types: 6 Cans of beer per week    Drug use: Never    Sexual activity: Not on file   Other Topics Concern    Not on file   Social History Narrative    Not on file     Social Determinants of Health     Financial Resource Strain: Not on file   Food Insecurity: Not on file   Transportation Needs: Not on file   Physical Activity: Not on file   Stress: Not on file   Social Connections: Not

## 2024-12-04 RX ORDER — PRASUGREL 10 MG/1
10 TABLET, FILM COATED ORAL DAILY
Qty: 90 TABLET | Refills: 1 | Status: SHIPPED | OUTPATIENT
Start: 2024-12-04

## 2025-04-17 RX ORDER — LISINOPRIL 10 MG/1
10 TABLET ORAL DAILY
Qty: 90 TABLET | Refills: 0 | Status: SHIPPED | OUTPATIENT
Start: 2025-04-17

## 2025-04-17 RX ORDER — METOPROLOL TARTRATE 25 MG/1
25 TABLET, FILM COATED ORAL 2 TIMES DAILY
Qty: 180 TABLET | Refills: 0 | Status: SHIPPED | OUTPATIENT
Start: 2025-04-17

## 2025-04-17 RX ORDER — PRASUGREL 10 MG/1
10 TABLET, FILM COATED ORAL DAILY
Qty: 90 TABLET | Refills: 0 | Status: SHIPPED | OUTPATIENT
Start: 2025-04-17

## 2025-04-17 RX ORDER — ATORVASTATIN CALCIUM 40 MG/1
40 TABLET, FILM COATED ORAL NIGHTLY
Qty: 90 TABLET | Refills: 0 | Status: SHIPPED | OUTPATIENT
Start: 2025-04-17

## 2025-04-17 NOTE — TELEPHONE ENCOUNTER
Denzel Boss called requesting a refill on the following medications:  Requested Prescriptions     Pending Prescriptions Disp Refills    prasugrel (EFFIENT) 10 MG TABS 90 tablet 1     Sig: Take 1 tablet by mouth daily    lisinopril (PRINIVIL;ZESTRIL) 10 MG tablet 90 tablet 3     Sig: Take 1 tablet by mouth daily    metoprolol tartrate (LOPRESSOR) 25 MG tablet 180 tablet 3     Sig: Take 1 tablet by mouth 2 times daily    atorvastatin (LIPITOR) 40 MG tablet 90 tablet 10     Sig: Take 1 tablet by mouth nightly     Pharmacy verified: [facility]  .[facility]      Date of last visit: 11/26/2024  Date of next visit (if applicable): 5/27/2025

## 2025-05-19 RX ORDER — METOPROLOL TARTRATE 25 MG/1
25 TABLET, FILM COATED ORAL 2 TIMES DAILY
Qty: 180 TABLET | Refills: 0 | Status: SHIPPED | OUTPATIENT
Start: 2025-05-19

## 2025-05-27 ENCOUNTER — OFFICE VISIT (OUTPATIENT)
Dept: CARDIOLOGY CLINIC | Age: 60
End: 2025-05-27
Payer: MEDICARE

## 2025-05-27 VITALS
WEIGHT: 248.6 LBS | SYSTOLIC BLOOD PRESSURE: 129 MMHG | HEIGHT: 75 IN | HEART RATE: 68 BPM | DIASTOLIC BLOOD PRESSURE: 80 MMHG | BODY MASS INDEX: 30.91 KG/M2

## 2025-05-27 DIAGNOSIS — R06.02 SOB (SHORTNESS OF BREATH) ON EXERTION: ICD-10-CM

## 2025-05-27 DIAGNOSIS — I25.10 CORONARY ARTERY DISEASE INVOLVING NATIVE CORONARY ARTERY OF NATIVE HEART, UNSPECIFIED WHETHER ANGINA PRESENT: ICD-10-CM

## 2025-05-27 DIAGNOSIS — I10 PRIMARY HYPERTENSION: ICD-10-CM

## 2025-05-27 DIAGNOSIS — Z95.820 STATUS POST ANGIOPLASTY WITH STENT: ICD-10-CM

## 2025-05-27 DIAGNOSIS — E78.5 DYSLIPIDEMIA: ICD-10-CM

## 2025-05-27 DIAGNOSIS — R07.9 CHEST PAIN IN ADULT: Primary | ICD-10-CM

## 2025-05-27 DIAGNOSIS — R53.82 CHRONIC FATIGUE: ICD-10-CM

## 2025-05-27 PROCEDURE — 99214 OFFICE O/P EST MOD 30 MIN: CPT | Performed by: INTERNAL MEDICINE

## 2025-05-27 PROCEDURE — 1036F TOBACCO NON-USER: CPT | Performed by: INTERNAL MEDICINE

## 2025-05-27 PROCEDURE — 93000 ELECTROCARDIOGRAM COMPLETE: CPT | Performed by: INTERNAL MEDICINE

## 2025-05-27 PROCEDURE — 3017F COLORECTAL CA SCREEN DOC REV: CPT | Performed by: INTERNAL MEDICINE

## 2025-05-27 PROCEDURE — G8427 DOCREV CUR MEDS BY ELIG CLIN: HCPCS | Performed by: INTERNAL MEDICINE

## 2025-05-27 PROCEDURE — 3074F SYST BP LT 130 MM HG: CPT | Performed by: INTERNAL MEDICINE

## 2025-05-27 PROCEDURE — 3079F DIAST BP 80-89 MM HG: CPT | Performed by: INTERNAL MEDICINE

## 2025-05-27 PROCEDURE — G8417 CALC BMI ABV UP PARAM F/U: HCPCS | Performed by: INTERNAL MEDICINE

## 2025-05-27 RX ORDER — SEMAGLUTIDE 0.68 MG/ML
0.5 INJECTION, SOLUTION SUBCUTANEOUS
COMMUNITY
Start: 2025-04-14 | End: 2025-05-27

## 2025-05-27 RX ORDER — VALSARTAN 80 MG/1
80 TABLET ORAL DAILY
Qty: 90 TABLET | Refills: 3 | Status: SHIPPED | OUTPATIENT
Start: 2025-05-27

## 2025-05-27 RX ORDER — SEMAGLUTIDE 0.68 MG/ML
INJECTION, SOLUTION SUBCUTANEOUS
COMMUNITY

## 2025-05-27 RX ORDER — RANOLAZINE 500 MG/1
500 TABLET, EXTENDED RELEASE ORAL 2 TIMES DAILY
Qty: 180 TABLET | Refills: 3 | Status: SHIPPED | OUTPATIENT
Start: 2025-05-27

## 2025-05-27 RX ORDER — OMEPRAZOLE 40 MG/1
CAPSULE, DELAYED RELEASE ORAL
COMMUNITY
Start: 2025-05-11

## 2025-05-27 RX ORDER — ACYCLOVIR 400 MG/1
TABLET ORAL
COMMUNITY
Start: 2025-04-11

## 2025-05-27 NOTE — PROGRESS NOTES
Chief Complaint   Patient presents with    6 Month Follow-Up     6 month follow up.       originally here - abnormal stress test and cp on exertion         6 month follow up.    EKG done today.    Chest pain, sharp, for the last 1 month 1 to 2x/ week, last 5 min  and 7/10, non-radiating , not exertion induced , no inducer or reliever. No associated sob or sweat.    Sob on exertion chronic and a little worse    Fatigue  more`, no energy    Denies palpitations, dizziness and ALEXANDRA.      PMHX  DM for 15 yrs    Nonsmoker    Quit 32 yrs back    FHx  Mother had pacer  Grabdfather had mi in his 40's    Past Surgical History:   Procedure Laterality Date    APPENDECTOMY      CARDIAC PROCEDURE N/A 11/10/2023    Left heart cath / coronary angiography performed by Avis Carlisle MD at [facility] CARDIAC CATH LAB    CARDIAC PROCEDURE N/A 11/10/2023    Percutaneous coronary intervention performed by Garcia Olivares MD at [facility] CARDIAC CATH LAB    CATARACT REMOVAL Left     EYE SURGERY         No Known Allergies     Family History   Problem Relation Age of Onset    Heart Disease Mother         pacer    COPD Mother     Cancer Father     Diabetes Sister     Diabetes Brother         Social History     Socioeconomic History    Marital status:      Spouse name: Not on file    Number of children: Not on file    Years of education: Not on file    Highest education level: Not on file   Occupational History    Not on file   Tobacco Use    Smoking status: Former     Current packs/day: 0.00     Types: Cigarettes     Quit date:      Years since quittin.4    Smokeless tobacco: Never   Vaping Use    Vaping status: Never Used   Substance and Sexual Activity    Alcohol use: Yes     Alcohol/week: 6.0 standard drinks of alcohol     Types: 6 Cans of beer per week    Drug use: Never    Sexual activity: Not on file   Other Topics Concern    Not on file   Social History Narrative    Not on file     Social Drivers of Health     Financial

## 2025-06-10 ENCOUNTER — HOSPITAL ENCOUNTER (OUTPATIENT)
Dept: NUCLEAR MEDICINE | Age: 60
Discharge: HOME | End: 2025-06-10
Attending: INTERNAL MEDICINE
Payer: MEDICARE

## 2025-06-10 ENCOUNTER — HOSPITAL ENCOUNTER (OUTPATIENT)
Age: 60
Discharge: HOME | End: 2025-06-12
Attending: INTERNAL MEDICINE
Payer: MEDICARE

## 2025-06-10 VITALS — BODY MASS INDEX: 30.84 KG/M2 | WEIGHT: 248 LBS | HEIGHT: 75 IN

## 2025-06-10 DIAGNOSIS — R07.9 CHEST PAIN IN ADULT: ICD-10-CM

## 2025-06-10 DIAGNOSIS — I10 PRIMARY HYPERTENSION: ICD-10-CM

## 2025-06-10 DIAGNOSIS — I25.10 CORONARY ARTERY DISEASE INVOLVING NATIVE CORONARY ARTERY OF NATIVE HEART, UNSPECIFIED WHETHER ANGINA PRESENT: ICD-10-CM

## 2025-06-10 DIAGNOSIS — R06.02 SOB (SHORTNESS OF BREATH) ON EXERTION: ICD-10-CM

## 2025-06-10 DIAGNOSIS — R53.82 CHRONIC FATIGUE: ICD-10-CM

## 2025-06-10 DIAGNOSIS — Z95.820 STATUS POST ANGIOPLASTY WITH STENT: ICD-10-CM

## 2025-06-10 DIAGNOSIS — E78.5 DYSLIPIDEMIA: ICD-10-CM

## 2025-06-10 LAB
ECHO AO ASC DIAM: 3.3 CM
ECHO AO ASCENDING AORTA INDEX: 1.38 CM/M2
ECHO AV CUSP MM: 2.1 CM
ECHO AV PEAK GRADIENT: 10 MMHG
ECHO AV PEAK VELOCITY: 1.6 M/S
ECHO AV VELOCITY RATIO: 0.63
ECHO BSA: 2.44 M2
ECHO BSA: 2.44 M2
ECHO EST RA PRESSURE: 3 MMHG
ECHO LA AREA 2C: 21.3 CM2
ECHO LA AREA 4C: 19.4 CM2
ECHO LA DIAMETER INDEX: 1.71 CM/M2
ECHO LA DIAMETER: 4.1 CM
ECHO LA MAJOR AXIS: 5.6 CM
ECHO LA MINOR AXIS: 6.1 CM
ECHO LA VOL BP: 59 ML (ref 18–58)
ECHO LA VOL MOD A2C: 60 ML (ref 18–58)
ECHO LA VOL MOD A4C: 54 ML (ref 18–58)
ECHO LA VOL/BSA BIPLANE: 25 ML/M2 (ref 16–34)
ECHO LA VOLUME INDEX MOD A2C: 25 ML/M2 (ref 16–34)
ECHO LA VOLUME INDEX MOD A4C: 23 ML/M2 (ref 16–34)
ECHO LV E' LATERAL VELOCITY: 12.2 CM/S
ECHO LV E' SEPTAL VELOCITY: 7.9 CM/S
ECHO LV EF PHYSICIAN: 55 %
ECHO LV FRACTIONAL SHORTENING: 31 % (ref 28–44)
ECHO LV INTERNAL DIMENSION DIASTOLE INDEX: 2.17 CM/M2
ECHO LV INTERNAL DIMENSION DIASTOLIC: 5.2 CM (ref 4.2–5.9)
ECHO LV INTERNAL DIMENSION SYSTOLIC INDEX: 1.5 CM/M2
ECHO LV INTERNAL DIMENSION SYSTOLIC: 3.6 CM
ECHO LV ISOVOLUMETRIC RELAXATION TIME (IVRT): 70 MS
ECHO LV IVSD: 1.2 CM (ref 0.6–1)
ECHO LV MASS 2D: 248.8 G (ref 88–224)
ECHO LV MASS INDEX 2D: 103.7 G/M2 (ref 49–115)
ECHO LV POSTERIOR WALL DIASTOLIC: 1.2 CM (ref 0.6–1)
ECHO LV RELATIVE WALL THICKNESS RATIO: 0.46
ECHO LVOT PEAK GRADIENT: 4 MMHG
ECHO LVOT PEAK VELOCITY: 1 M/S
ECHO MV A VELOCITY: 0.69 M/S
ECHO MV E DECELERATION TIME (DT): 261 MS
ECHO MV E VELOCITY: 0.78 M/S
ECHO MV E/A RATIO: 1.13
ECHO MV E/E' LATERAL: 6.39
ECHO MV E/E' RATIO (AVERAGED): 8.13
ECHO MV E/E' SEPTAL: 9.87
ECHO PV MAX VELOCITY: 0.9 M/S
ECHO PV PEAK GRADIENT: 3 MMHG
ECHO RIGHT VENTRICULAR SYSTOLIC PRESSURE (RVSP): 13 MMHG
ECHO RV INTERNAL DIMENSION: 3.1 CM
ECHO RV TAPSE: 2.5 CM (ref 1.7–?)
ECHO TV E WAVE: 0.6 M/S
ECHO TV REGURGITANT MAX VELOCITY: 1.55 M/S
ECHO TV REGURGITANT PEAK GRADIENT: 10 MMHG
NUC STRESS EJECTION FRACTION: 55 %
STRESS BASELINE DIAS BP: 62 MMHG
STRESS BASELINE HR: 65 BPM
STRESS BASELINE ST DEPRESSION: 0 MM
STRESS BASELINE SYS BP: 109 MMHG
STRESS ESTIMATED WORKLOAD: 1 METS
STRESS PEAK DIAS BP: 60 MMHG
STRESS PEAK SYS BP: 129 MMHG
STRESS PERCENT HR ACHIEVED: 53 %
STRESS POST PEAK HR: 85 BPM
STRESS RATE PRESSURE PRODUCT: NORMAL BPM*MMHG
STRESS ST DEPRESSION: 0 MM
STRESS STAGE 1 BP: NORMAL MMHG
STRESS STAGE 1 DURATION: 1 MIN:SEC
STRESS STAGE 1 HR: 80 BPM
STRESS STAGE 2 BP: NORMAL MMHG
STRESS STAGE 2 DURATION: 1 MIN:SEC
STRESS STAGE 2 HR: 81 BPM
STRESS STAGE 3 BP: NORMAL MMHG
STRESS STAGE 3 DURATION: 1 MIN:SEC
STRESS STAGE 3 HR: 78 BPM
STRESS STAGE RECOVERY 1 BP: NORMAL MMHG
STRESS STAGE RECOVERY 1 DURATION: 1 MIN:SEC
STRESS STAGE RECOVERY 1 HR: 79 BPM
STRESS STAGE RECOVERY 2 BP: NORMAL MMHG
STRESS STAGE RECOVERY 2 DURATION: 1 MIN:SEC
STRESS STAGE RECOVERY 2 HR: 80 BPM
STRESS STAGE RECOVERY 3 BP: NORMAL MMHG
STRESS STAGE RECOVERY 3 DURATION: 1 MIN:SEC
STRESS STAGE RECOVERY 3 HR: 77 BPM
STRESS STAGE RECOVERY 4 BP: NORMAL MMHG
STRESS STAGE RECOVERY 4 DURATION: 1 MIN:SEC
STRESS STAGE RECOVERY 4 HR: 76 BPM
STRESS TARGET HR: 160 BPM
TID: 1.31

## 2025-06-10 PROCEDURE — 93306 TTE W/DOPPLER COMPLETE: CPT | Performed by: INTERNAL MEDICINE

## 2025-06-10 PROCEDURE — A9500 TC99M SESTAMIBI: HCPCS | Performed by: INTERNAL MEDICINE

## 2025-06-10 PROCEDURE — 78452 HT MUSCLE IMAGE SPECT MULT: CPT | Performed by: INTERNAL MEDICINE

## 2025-06-10 PROCEDURE — 6360000002 HC RX W HCPCS: Performed by: INTERNAL MEDICINE

## 2025-06-10 PROCEDURE — 93017 CV STRESS TEST TRACING ONLY: CPT

## 2025-06-10 PROCEDURE — 78452 HT MUSCLE IMAGE SPECT MULT: CPT

## 2025-06-10 PROCEDURE — 3430000000 HC RX DIAGNOSTIC RADIOPHARMACEUTICAL: Performed by: INTERNAL MEDICINE

## 2025-06-10 PROCEDURE — 93018 CV STRESS TEST I&R ONLY: CPT | Performed by: INTERNAL MEDICINE

## 2025-06-10 PROCEDURE — 93306 TTE W/DOPPLER COMPLETE: CPT

## 2025-06-10 PROCEDURE — 93016 CV STRESS TEST SUPVJ ONLY: CPT | Performed by: INTERNAL MEDICINE

## 2025-06-10 RX ORDER — REGADENOSON 0.08 MG/ML
0.4 INJECTION, SOLUTION INTRAVENOUS
Status: COMPLETED | OUTPATIENT
Start: 2025-06-10 | End: 2025-06-10

## 2025-06-10 RX ORDER — TETRAKIS(2-METHOXYISOBUTYLISOCYANIDE)COPPER(I) TETRAFLUOROBORATE 1 MG/ML
10.2 INJECTION, POWDER, LYOPHILIZED, FOR SOLUTION INTRAVENOUS
Status: COMPLETED | OUTPATIENT
Start: 2025-06-10 | End: 2025-06-10

## 2025-06-10 RX ORDER — TETRAKIS(2-METHOXYISOBUTYLISOCYANIDE)COPPER(I) TETRAFLUOROBORATE 1 MG/ML
34.9 INJECTION, POWDER, LYOPHILIZED, FOR SOLUTION INTRAVENOUS
Status: COMPLETED | OUTPATIENT
Start: 2025-06-10 | End: 2025-06-10

## 2025-06-10 RX ADMIN — Medication 34.9 MILLICURIE: at 09:40

## 2025-06-10 RX ADMIN — REGADENOSON 0.4 MG: 0.08 INJECTION, SOLUTION INTRAVENOUS at 09:45

## 2025-06-10 RX ADMIN — Medication 10.2 MILLICURIE: at 08:40

## 2025-08-18 RX ORDER — LISINOPRIL 10 MG/1
TABLET ORAL
Qty: 90 TABLET | Refills: 11 | Status: SHIPPED | OUTPATIENT
Start: 2025-08-18